# Patient Record
Sex: FEMALE | Race: WHITE | Employment: OTHER | URBAN - METROPOLITAN AREA
[De-identification: names, ages, dates, MRNs, and addresses within clinical notes are randomized per-mention and may not be internally consistent; named-entity substitution may affect disease eponyms.]

---

## 2017-08-04 PROBLEM — M48.061 SPINAL STENOSIS, LUMBAR REGION, WITHOUT NEUROGENIC CLAUDICATION: Status: ACTIVE | Noted: 2017-08-04

## 2017-08-09 ENCOUNTER — TELEPHONE (OUTPATIENT)
Dept: PODIATRY | Facility: CLINIC | Age: 72
End: 2017-08-09

## 2017-10-10 PROBLEM — M47.817 LUMBOSACRAL SPONDYLOSIS WITHOUT MYELOPATHY: Status: ACTIVE | Noted: 2017-10-10

## 2019-12-29 ENCOUNTER — HOSPITAL ENCOUNTER (OUTPATIENT)
Dept: GENERAL RADIOLOGY | Age: 74
Discharge: HOME | End: 2019-12-31
Payer: MEDICARE

## 2019-12-29 ENCOUNTER — HOSPITAL ENCOUNTER (OUTPATIENT)
Dept: MRI IMAGING | Age: 74
Discharge: HOME | End: 2019-12-31
Payer: MEDICARE

## 2019-12-29 LAB
GFR AFRICAN AMERICAN: >60
GFR NON-AFRICAN AMERICAN: >60
PERFORMED ON: NORMAL
POC CREATININE: 0.7 MG/DL (ref 0.6–1.2)
POC SAMPLE TYPE: NORMAL

## 2019-12-29 PROCEDURE — 72158 MRI LUMBAR SPINE W/O & W/DYE: CPT

## 2019-12-29 PROCEDURE — 6360000004 HC RX CONTRAST MEDICATION: Performed by: NEUROLOGICAL SURGERY

## 2019-12-29 PROCEDURE — 72110 X-RAY EXAM L-2 SPINE 4/>VWS: CPT

## 2019-12-29 PROCEDURE — A9579 GAD-BASE MR CONTRAST NOS,1ML: HCPCS | Performed by: NEUROLOGICAL SURGERY

## 2019-12-29 RX ADMIN — GADOTERIDOL 15 ML: 279.3 INJECTION, SOLUTION INTRAVENOUS at 08:18

## 2020-02-04 ENCOUNTER — HOSPITAL ENCOUNTER (OUTPATIENT)
Dept: WOMENS IMAGING | Age: 75
Discharge: HOME | End: 2020-02-06
Payer: MEDICARE

## 2020-02-04 PROCEDURE — 77080 DXA BONE DENSITY AXIAL: CPT

## 2020-06-04 ENCOUNTER — HOSPITAL ENCOUNTER (OUTPATIENT)
Dept: PREADMISSION TESTING | Age: 75
Discharge: HOME | DRG: 242 | End: 2020-06-08
Payer: MEDICARE

## 2020-06-04 ENCOUNTER — HOSPITAL ENCOUNTER (OUTPATIENT)
Dept: GENERAL RADIOLOGY | Age: 75
Discharge: HOME | DRG: 242 | End: 2020-06-06
Payer: MEDICARE

## 2020-06-04 VITALS
RESPIRATION RATE: 18 BRPM | HEART RATE: 83 BPM | DIASTOLIC BLOOD PRESSURE: 70 MMHG | OXYGEN SATURATION: 98 % | WEIGHT: 179.25 LBS | TEMPERATURE: 96.7 F | BODY MASS INDEX: 28.81 KG/M2 | HEIGHT: 66 IN | SYSTOLIC BLOOD PRESSURE: 134 MMHG

## 2020-06-04 PROBLEM — M47.816 LUMBAR SPONDYLOSIS: Status: ACTIVE | Noted: 2020-06-04

## 2020-06-04 PROBLEM — I48.0 PAF (PAROXYSMAL ATRIAL FIBRILLATION) (HCC): Chronic | Status: ACTIVE | Noted: 2020-06-04

## 2020-06-04 PROBLEM — M48.061 FORAMINAL STENOSIS OF LUMBAR REGION: Status: ACTIVE | Noted: 2020-06-04

## 2020-06-04 PROBLEM — K56.0 PARALYTIC ILEUS (HCC): Chronic | Status: ACTIVE | Noted: 2020-06-04

## 2020-06-04 PROBLEM — E11.9 DIABETES MELLITUS TYPE 2, UNCOMPLICATED (HCC): Status: ACTIVE | Noted: 2020-06-04

## 2020-06-04 LAB
ABO/RH: NORMAL
ANTIBODY SCREEN: NORMAL
EKG ATRIAL RATE: 55 BPM
EKG P AXIS: 65 DEGREES
EKG P-R INTERVAL: 164 MS
EKG Q-T INTERVAL: 430 MS
EKG QRS DURATION: 146 MS
EKG QTC CALCULATION (BAZETT): 411 MS
EKG R AXIS: -36 DEGREES
EKG T AXIS: 104 DEGREES
EKG VENTRICULAR RATE: 55 BPM
INR BLD: 1.1
PROTHROMBIN TIME: 13.9 SEC (ref 12.3–14.9)

## 2020-06-04 PROCEDURE — 86901 BLOOD TYPING SEROLOGIC RH(D): CPT

## 2020-06-04 PROCEDURE — 72082 X-RAY EXAM ENTIRE SPI 2/3 VW: CPT

## 2020-06-04 PROCEDURE — 85610 PROTHROMBIN TIME: CPT

## 2020-06-04 PROCEDURE — 86900 BLOOD TYPING SEROLOGIC ABO: CPT

## 2020-06-04 PROCEDURE — 93010 ELECTROCARDIOGRAM REPORT: CPT | Performed by: INTERNAL MEDICINE

## 2020-06-04 PROCEDURE — 93005 ELECTROCARDIOGRAM TRACING: CPT | Performed by: NURSE PRACTITIONER

## 2020-06-04 PROCEDURE — 86850 RBC ANTIBODY SCREEN: CPT

## 2020-06-04 RX ORDER — SODIUM CHLORIDE 0.9 % (FLUSH) 0.9 %
10 SYRINGE (ML) INJECTION EVERY 12 HOURS SCHEDULED
Status: CANCELLED | OUTPATIENT
Start: 2020-06-04

## 2020-06-04 RX ORDER — SODIUM CHLORIDE 0.9 % (FLUSH) 0.9 %
10 SYRINGE (ML) INJECTION PRN
Status: CANCELLED | OUTPATIENT
Start: 2020-06-04

## 2020-06-04 RX ORDER — LIDOCAINE HYDROCHLORIDE 10 MG/ML
1 INJECTION, SOLUTION EPIDURAL; INFILTRATION; INTRACAUDAL; PERINEURAL
Status: CANCELLED | OUTPATIENT
Start: 2020-06-04 | End: 2020-06-04

## 2020-06-04 RX ORDER — ACETAMINOPHEN 325 MG/1
650 TABLET ORAL EVERY 6 HOURS PRN
COMMUNITY

## 2020-06-04 RX ORDER — SODIUM CHLORIDE, SODIUM LACTATE, POTASSIUM CHLORIDE, CALCIUM CHLORIDE 600; 310; 30; 20 MG/100ML; MG/100ML; MG/100ML; MG/100ML
INJECTION, SOLUTION INTRAVENOUS ONCE
Status: CANCELLED | OUTPATIENT
Start: 2020-06-11

## 2020-06-04 RX ORDER — CEFAZOLIN SODIUM 2 G/50ML
2 SOLUTION INTRAVENOUS ONCE
Status: CANCELLED | OUTPATIENT
Start: 2020-06-11

## 2020-06-04 RX ORDER — SODIUM CHLORIDE, SODIUM LACTATE, POTASSIUM CHLORIDE, CALCIUM CHLORIDE 600; 310; 30; 20 MG/100ML; MG/100ML; MG/100ML; MG/100ML
INJECTION, SOLUTION INTRAVENOUS CONTINUOUS
Status: CANCELLED | OUTPATIENT
Start: 2020-06-04

## 2020-06-04 RX ORDER — MULTIVIT-MIN/IRON/FOLIC ACID/K 18-600-40
CAPSULE ORAL
Status: ON HOLD | COMMUNITY
End: 2020-06-09 | Stop reason: HOSPADM

## 2020-06-04 RX ORDER — DOCUSATE SODIUM 100 MG/1
100 CAPSULE, LIQUID FILLED ORAL 2 TIMES DAILY
COMMUNITY

## 2020-06-04 RX ORDER — TIZANIDINE 4 MG/1
4 TABLET ORAL EVERY 6 HOURS PRN
COMMUNITY
End: 2020-06-10 | Stop reason: SDUPTHER

## 2020-06-04 ASSESSMENT — ENCOUNTER SYMPTOMS
DIARRHEA: 0
STRIDOR: 0
TROUBLE SWALLOWING: 0
ALLERGIC/IMMUNOLOGIC NEGATIVE: 1
CHEST TIGHTNESS: 0
BACK PAIN: 1
ABDOMINAL PAIN: 0
CONSTIPATION: 1
WHEEZING: 0
SORE THROAT: 0
NAUSEA: 0
EYES NEGATIVE: 1
COUGH: 0
SHORTNESS OF BREATH: 0
VOMITING: 0

## 2020-06-04 NOTE — H&P
pain 60 tablet 0     No current facility-administered medications for this encounter. Allergies: Other and Dilaudid [hydromorphone hcl]    Social History:   Social History     Socioeconomic History    Marital status:      Spouse name: Not on file    Number of children: Not on file    Years of education: Not on file    Highest education level: Not on file   Occupational History    Not on file   Social Needs    Financial resource strain: Not on file    Food insecurity     Worry: Not on file     Inability: Not on file    Transportation needs     Medical: Not on file     Non-medical: Not on file   Tobacco Use    Smoking status: Never Smoker    Smokeless tobacco: Never Used   Substance and Sexual Activity    Alcohol use: No     Alcohol/week: 0.0 standard drinks    Drug use: No    Sexual activity: Not on file   Lifestyle    Physical activity     Days per week: Not on file     Minutes per session: Not on file    Stress: Not on file   Relationships    Social connections     Talks on phone: Not on file     Gets together: Not on file     Attends Amish service: Not on file     Active member of club or organization: Not on file     Attends meetings of clubs or organizations: Not on file     Relationship status: Not on file    Intimate partner violence     Fear of current or ex partner: Not on file     Emotionally abused: Not on file     Physically abused: Not on file     Forced sexual activity: Not on file   Other Topics Concern    Not on file   Social History Narrative    Not on file       Family History:       Problem Relation Age of Onset    Arthritis Mother     High Blood Pressure Father     Heart Disease Father        Review of Systems   Constitutional: Negative. Negative for chills and fever. HENT: Positive for congestion (sinus), hearing loss, postnasal drip and tinnitus. Negative for sore throat and trouble swallowing. Bilateral hearing aides   Eyes: Negative. Negative for visual disturbance. IOL OU   Respiratory: Negative for cough, chest tightness, shortness of breath, wheezing and stridor. Cardiovascular: Negative for chest pain and palpitations. Gastrointestinal: Positive for constipation (prone ). Negative for abdominal pain, diarrhea, nausea and vomiting. Endocrine:        Dx diabetes   Genitourinary: Negative for dysuria and frequency. Musculoskeletal: Positive for back pain (radiates to left leg). Negative for myalgias and neck pain. Skin: Negative. Allergic/Immunologic: Negative. Neurological: Negative. Negative for seizures and headaches. Hematological: Negative. Psychiatric/Behavioral: Negative. Vitals: There were no vitals taken for this visit. Physical Exam  Constitutional:       Appearance: She is well-developed. HENT:      Head: Normocephalic and atraumatic. Right Ear: Tympanic membrane, ear canal and external ear normal.      Left Ear: Tympanic membrane, ear canal and external ear normal.      Nose: No congestion. Mouth/Throat:      Mouth: Mucous membranes are moist.   Eyes:      General: No scleral icterus. Extraocular Movements: Extraocular movements intact. Conjunctiva/sclera: Conjunctivae normal.      Pupils: Pupils are equal, round, and reactive to light. Comments: IOL OU. Neck:      Musculoskeletal: Normal range of motion. Thyroid: No thyromegaly. Vascular: No JVD. Trachea: No tracheal deviation. Cardiovascular:      Rate and Rhythm: Normal rate and regular rhythm. Heart sounds: Normal heart sounds. No murmur. No gallop. Pulmonary:      Effort: Pulmonary effort is normal. No respiratory distress. Breath sounds: Normal breath sounds. No wheezing or rales. Abdominal:      General: Bowel sounds are normal.      Palpations: Abdomen is soft. Tenderness: There is no abdominal tenderness. Comments: Mid line OR scar.    Genitourinary:     Comments:

## 2020-06-05 ENCOUNTER — HOSPITAL ENCOUNTER (INPATIENT)
Age: 75
LOS: 4 days | Discharge: HOME | DRG: 242 | End: 2020-06-09
Attending: EMERGENCY MEDICINE | Admitting: INTERNAL MEDICINE
Payer: MEDICARE

## 2020-06-05 LAB
ABO/RH: NORMAL
ALBUMIN SERPL-MCNC: 4.3 G/DL (ref 3.5–4.6)
ALP BLD-CCNC: 64 U/L (ref 40–130)
ALT SERPL-CCNC: 27 U/L (ref 0–33)
ANION GAP SERPL CALCULATED.3IONS-SCNC: 14 MEQ/L (ref 9–15)
ANTIBODY SCREEN: NORMAL
APTT: 30.3 SEC (ref 24.4–36.8)
AST SERPL-CCNC: 26 U/L (ref 0–35)
BASOPHILS ABSOLUTE: 0.1 K/UL (ref 0–0.2)
BASOPHILS RELATIVE PERCENT: 0.7 %
BILIRUB SERPL-MCNC: <0.2 MG/DL (ref 0.2–0.7)
BUN BLDV-MCNC: 17 MG/DL (ref 8–23)
CALCIUM SERPL-MCNC: 9.8 MG/DL (ref 8.5–9.9)
CHLORIDE BLD-SCNC: 100 MEQ/L (ref 95–107)
CO2: 23 MEQ/L (ref 20–31)
CREAT SERPL-MCNC: 0.94 MG/DL (ref 0.5–0.9)
EOSINOPHILS ABSOLUTE: 0.1 K/UL (ref 0–0.7)
EOSINOPHILS RELATIVE PERCENT: 0.6 %
GFR AFRICAN AMERICAN: >60
GFR NON-AFRICAN AMERICAN: 57.8
GLOBULIN: 2.9 G/DL (ref 2.3–3.5)
GLUCOSE BLD-MCNC: 210 MG/DL (ref 70–99)
HCT VFR BLD CALC: 38.5 % (ref 37–47)
HEMOGLOBIN: 12.7 G/DL (ref 12–16)
INR BLD: 1.1
LYMPHOCYTES ABSOLUTE: 4.1 K/UL (ref 1–4.8)
LYMPHOCYTES RELATIVE PERCENT: 40 %
MAGNESIUM: 1.6 MG/DL (ref 1.7–2.4)
MCH RBC QN AUTO: 29.8 PG (ref 27–31.3)
MCHC RBC AUTO-ENTMCNC: 33.1 % (ref 33–37)
MCV RBC AUTO: 90.2 FL (ref 82–100)
MONOCYTES ABSOLUTE: 0.7 K/UL (ref 0.2–0.8)
MONOCYTES RELATIVE PERCENT: 6.5 %
NEUTROPHILS ABSOLUTE: 5.3 K/UL (ref 1.4–6.5)
NEUTROPHILS RELATIVE PERCENT: 52.2 %
PDW BLD-RTO: 13.1 % (ref 11.5–14.5)
PLATELET # BLD: 403 K/UL (ref 130–400)
POTASSIUM SERPL-SCNC: 4.5 MEQ/L (ref 3.4–4.9)
PROTHROMBIN TIME: 14 SEC (ref 12.3–14.9)
RBC # BLD: 4.27 M/UL (ref 4.2–5.4)
SODIUM BLD-SCNC: 137 MEQ/L (ref 135–144)
TOTAL PROTEIN: 7.2 G/DL (ref 6.3–8)
TROPONIN: <0.01 NG/ML (ref 0–0.01)
TROPONIN: <0.01 NG/ML (ref 0–0.01)
TSH SERPL DL<=0.05 MIU/L-ACNC: 1.33 UIU/ML (ref 0.44–3.86)
WBC # BLD: 10.1 K/UL (ref 4.8–10.8)

## 2020-06-05 PROCEDURE — 93005 ELECTROCARDIOGRAM TRACING: CPT | Performed by: EMERGENCY MEDICINE

## 2020-06-05 PROCEDURE — 2000000000 HC ICU R&B

## 2020-06-05 PROCEDURE — 85730 THROMBOPLASTIN TIME PARTIAL: CPT

## 2020-06-05 PROCEDURE — 86850 RBC ANTIBODY SCREEN: CPT

## 2020-06-05 PROCEDURE — 80053 COMPREHEN METABOLIC PANEL: CPT

## 2020-06-05 PROCEDURE — 2500000003 HC RX 250 WO HCPCS

## 2020-06-05 PROCEDURE — C1887 CATHETER, GUIDING: HCPCS

## 2020-06-05 PROCEDURE — 99285 EMERGENCY DEPT VISIT HI MDM: CPT

## 2020-06-05 PROCEDURE — 86901 BLOOD TYPING SEROLOGIC RH(D): CPT

## 2020-06-05 PROCEDURE — 83735 ASSAY OF MAGNESIUM: CPT

## 2020-06-05 PROCEDURE — 33210 INSERT ELECTRD/PM CATH SNGL: CPT | Performed by: INTERNAL MEDICINE

## 2020-06-05 PROCEDURE — 2580000003 HC RX 258: Performed by: INTERNAL MEDICINE

## 2020-06-05 PROCEDURE — 96374 THER/PROPH/DIAG INJ IV PUSH: CPT

## 2020-06-05 PROCEDURE — 2580000003 HC RX 258

## 2020-06-05 PROCEDURE — 6370000000 HC RX 637 (ALT 250 FOR IP): Performed by: INTERNAL MEDICINE

## 2020-06-05 PROCEDURE — C1894 INTRO/SHEATH, NON-LASER: HCPCS

## 2020-06-05 PROCEDURE — 85610 PROTHROMBIN TIME: CPT

## 2020-06-05 PROCEDURE — 5A1223Z PERFORMANCE OF CARDIAC PACING, CONTINUOUS: ICD-10-PCS | Performed by: INTERNAL MEDICINE

## 2020-06-05 PROCEDURE — 85025 COMPLETE CBC W/AUTO DIFF WBC: CPT

## 2020-06-05 PROCEDURE — 6360000002 HC RX W HCPCS: Performed by: EMERGENCY MEDICINE

## 2020-06-05 PROCEDURE — 36415 COLL VENOUS BLD VENIPUNCTURE: CPT

## 2020-06-05 PROCEDURE — 84443 ASSAY THYROID STIM HORMONE: CPT

## 2020-06-05 PROCEDURE — 86900 BLOOD TYPING SEROLOGIC ABO: CPT

## 2020-06-05 PROCEDURE — 99223 1ST HOSP IP/OBS HIGH 75: CPT | Performed by: INTERNAL MEDICINE

## 2020-06-05 PROCEDURE — 2580000003 HC RX 258: Performed by: EMERGENCY MEDICINE

## 2020-06-05 PROCEDURE — 6360000002 HC RX W HCPCS

## 2020-06-05 PROCEDURE — 92953 TEMPORARY EXTERNAL PACING: CPT

## 2020-06-05 PROCEDURE — 84484 ASSAY OF TROPONIN QUANT: CPT

## 2020-06-05 PROCEDURE — 6830039000 HC L3 TRAUMA ALERT

## 2020-06-05 RX ORDER — LANOLIN ALCOHOL/MO/W.PET/CERES
250 CREAM (GRAM) TOPICAL DAILY
Status: DISCONTINUED | OUTPATIENT
Start: 2020-06-05 | End: 2020-06-09 | Stop reason: HOSPADM

## 2020-06-05 RX ORDER — LOSARTAN POTASSIUM 25 MG/1
25 TABLET ORAL NIGHTLY
Status: DISCONTINUED | OUTPATIENT
Start: 2020-06-05 | End: 2020-06-09 | Stop reason: HOSPADM

## 2020-06-05 RX ORDER — 0.9 % SODIUM CHLORIDE 0.9 %
1000 INTRAVENOUS SOLUTION INTRAVENOUS ONCE
Status: COMPLETED | OUTPATIENT
Start: 2020-06-05 | End: 2020-06-05

## 2020-06-05 RX ORDER — ROSUVASTATIN CALCIUM 5 MG/1
20 TABLET, COATED ORAL NIGHTLY
Status: DISCONTINUED | OUTPATIENT
Start: 2020-06-05 | End: 2020-06-09 | Stop reason: HOSPADM

## 2020-06-05 RX ORDER — HYDRALAZINE HYDROCHLORIDE 20 MG/ML
10 INJECTION INTRAMUSCULAR; INTRAVENOUS EVERY 10 MIN PRN
Status: DISCONTINUED | OUTPATIENT
Start: 2020-06-05 | End: 2020-06-09 | Stop reason: HOSPADM

## 2020-06-05 RX ORDER — GLIPIZIDE 5 MG/1
2.5 TABLET ORAL
Status: DISCONTINUED | OUTPATIENT
Start: 2020-06-06 | End: 2020-06-09 | Stop reason: HOSPADM

## 2020-06-05 RX ORDER — ACETAMINOPHEN 325 MG/1
650 TABLET ORAL EVERY 4 HOURS PRN
Status: DISCONTINUED | OUTPATIENT
Start: 2020-06-05 | End: 2020-06-08 | Stop reason: SDUPTHER

## 2020-06-05 RX ORDER — LABETALOL 20 MG/4 ML (5 MG/ML) INTRAVENOUS SYRINGE
10 EVERY 30 MIN PRN
Status: DISCONTINUED | OUTPATIENT
Start: 2020-06-05 | End: 2020-06-09 | Stop reason: HOSPADM

## 2020-06-05 RX ORDER — DOPAMINE HYDROCHLORIDE 160 MG/100ML
10 INJECTION, SOLUTION INTRAVENOUS CONTINUOUS
Status: DISCONTINUED | OUTPATIENT
Start: 2020-06-05 | End: 2020-06-05

## 2020-06-05 RX ORDER — ATROPINE SULFATE 0.1 MG/ML
0.5 INJECTION INTRAVENOUS ONCE
Status: COMPLETED | OUTPATIENT
Start: 2020-06-05 | End: 2020-06-05

## 2020-06-05 RX ORDER — ONDANSETRON 2 MG/ML
4 INJECTION INTRAMUSCULAR; INTRAVENOUS EVERY 6 HOURS PRN
Status: DISCONTINUED | OUTPATIENT
Start: 2020-06-05 | End: 2020-06-09 | Stop reason: HOSPADM

## 2020-06-05 RX ORDER — ACETAMINOPHEN 80 MG
TABLET,CHEWABLE ORAL ONCE
Status: COMPLETED | OUTPATIENT
Start: 2020-06-05 | End: 2020-06-05

## 2020-06-05 RX ORDER — GABAPENTIN 100 MG/1
100 CAPSULE ORAL NIGHTLY
Status: DISCONTINUED | OUTPATIENT
Start: 2020-06-05 | End: 2020-06-09 | Stop reason: HOSPADM

## 2020-06-05 RX ORDER — TIZANIDINE 4 MG/1
4 TABLET ORAL EVERY 6 HOURS PRN
Status: DISCONTINUED | OUTPATIENT
Start: 2020-06-05 | End: 2020-06-09 | Stop reason: HOSPADM

## 2020-06-05 RX ORDER — SODIUM CHLORIDE 9 MG/ML
INJECTION, SOLUTION INTRAVENOUS CONTINUOUS
Status: DISCONTINUED | OUTPATIENT
Start: 2020-06-05 | End: 2020-06-08

## 2020-06-05 RX ADMIN — GABAPENTIN 100 MG: 100 CAPSULE ORAL at 20:20

## 2020-06-05 RX ADMIN — LOSARTAN POTASSIUM 25 MG: 25 TABLET, FILM COATED ORAL at 20:20

## 2020-06-05 RX ADMIN — TIZANIDINE 4 MG: 4 TABLET ORAL at 18:27

## 2020-06-05 RX ADMIN — ATROPINE SULFATE 0.5 MG: 0.1 INJECTION PARENTERAL at 15:55

## 2020-06-05 RX ADMIN — SODIUM CHLORIDE 1000 ML: 9 INJECTION, SOLUTION INTRAVENOUS at 16:08

## 2020-06-05 RX ADMIN — ACETAMINOPHEN 650 MG: 325 TABLET, FILM COATED ORAL at 20:20

## 2020-06-05 RX ADMIN — DOPAMINE HYDROCHLORIDE 10 MCG/KG/MIN: 160 INJECTION, SOLUTION INTRAVENOUS at 16:04

## 2020-06-05 RX ADMIN — ROSUVASTATIN CALCIUM 20 MG: 5 TABLET, FILM COATED ORAL at 20:19

## 2020-06-05 RX ADMIN — Medication: at 22:31

## 2020-06-05 RX ADMIN — SODIUM CHLORIDE: 9 INJECTION, SOLUTION INTRAVENOUS at 18:24

## 2020-06-05 ASSESSMENT — PAIN DESCRIPTION - PAIN TYPE
TYPE: CHRONIC PAIN;ACUTE PAIN
TYPE: CHRONIC PAIN

## 2020-06-05 ASSESSMENT — ENCOUNTER SYMPTOMS
NAUSEA: 0
ABDOMINAL PAIN: 0
ALLERGIC/IMMUNOLOGIC NEGATIVE: 1
SORE THROAT: 0
GASTROINTESTINAL NEGATIVE: 1
SHORTNESS OF BREATH: 0
BACK PAIN: 0
EYES NEGATIVE: 1
COUGH: 0
VOMITING: 0
DIARRHEA: 0
WHEEZING: 0

## 2020-06-05 ASSESSMENT — PAIN DESCRIPTION - LOCATION
LOCATION: BACK

## 2020-06-05 ASSESSMENT — PAIN SCALES - GENERAL
PAINLEVEL_OUTOF10: 0
PAINLEVEL_OUTOF10: 0
PAINLEVEL_OUTOF10: 4
PAINLEVEL_OUTOF10: 5
PAINLEVEL_OUTOF10: 7
PAINLEVEL_OUTOF10: 8
PAINLEVEL_OUTOF10: 0
PAINLEVEL_OUTOF10: 7

## 2020-06-05 ASSESSMENT — PAIN DESCRIPTION - DESCRIPTORS: DESCRIPTORS: ACHING

## 2020-06-05 ASSESSMENT — PAIN DESCRIPTION - FREQUENCY: FREQUENCY: CONTINUOUS

## 2020-06-05 NOTE — PROGRESS NOTES
Medicated with Zanaflex for pack spasms.  Electronically signed by Clay Byrd RN on 6/5/2020 at 6:38 PM

## 2020-06-05 NOTE — H&P
([facility] [address] [facility]    Paralytic ileus ([facility] [address]       Past Surgical History:   Procedure Laterality Date    BACK SURGERY      x 3 ORs lumbar spine--last     CARPAL TUNNEL RELEASE Bilateral 2000s     SECTION   &     EYE SURGERY      [place] with IOL OU    HYSTERECTOMY  1987    SPINE SURGERY      TONSILLECTOMY      as child       Social History     Socioeconomic History    Marital status:      Spouse name: None    Number of children: None    Years of education: None    Highest education level: None   Occupational History    None   Social Needs    Financial resource strain: None    Food insecurity     Worry: None     Inability: None    Transportation needs     Medical: None     Non-medical: None   Tobacco Use    Smoking status: Never Smoker    Smokeless tobacco: Never Used   Substance and Sexual Activity    Alcohol use:  Yes     Alcohol/week: 0.0 standard drinks     Comment: social rare    Drug use: No    Sexual activity: None   Lifestyle    Physical activity     Days per week: None     Minutes per session: None    Stress: None   Relationships    Social connections     Talks on phone: None     Gets together: None     Attends Holiness service: None     Active member of club or organization: None     Attends meetings of clubs or organizations: None     Relationship status: None    Intimate partner violence     Fear of current or ex partner: None     Emotionally abused: None     Physically abused: None     Forced sexual activity: None   Other Topics Concern    None   Social History Narrative    None       Family History   Problem Relation Age of Onset    Arthritis Mother     High Blood Pressure Father     Heart Disease Father     Heart Failure Father     No Known Problems Sister     Heart Disease Son         cardiac stents    Diabetes Son     Diabetes Daughter         neuropathy    Thyroid Disease Daughter     Diabetes Sister     Kidney Disease Sister        Current Constitutional: Negative. Negative for chills and fever. HENT: Negative. Eyes: Negative. Respiratory: Negative for shortness of breath and wheezing. Cardiovascular: Negative for chest pain, palpitations and leg swelling. Gastrointestinal: Negative. Negative for abdominal pain, nausea and vomiting. Endocrine: Negative. Genitourinary: Negative. Musculoskeletal: Negative. Skin: Negative. Negative for rash. Allergic/Immunologic: Negative. Neurological: Positive for syncope. Negative for dizziness, weakness and headaches. Hematological: Negative. Psychiatric/Behavioral: Negative. VITALS:  Blood pressure (!) 177/71, pulse 77, temperature 98.1 °F (36.7 °C), temperature source Oral, resp. rate 18, height 5' 6\" (1.676 m), weight 180 lb (81.6 kg), SpO2 98 %. Body mass index is 29.05 kg/m². Physical Exam   Constitutional: She is oriented to person, place, and time. She appears well-developed and well-nourished. HENT:   Head: Normocephalic and atraumatic. Eyes: Pupils are equal, round, and reactive to light. Neck: Normal range of motion. Neck supple. No JVD present. No tracheal deviation present. No thyromegaly present. Cardiovascular: Normal rate, regular rhythm, normal heart sounds and intact distal pulses. PMI is not displaced. Exam reveals no gallop, no S3, no distant heart sounds and no friction rub. No murmur heard. Pulmonary/Chest: No respiratory distress. She has no wheezes. She has no rales. She exhibits no tenderness. Abdominal: Soft. Bowel sounds are normal. She exhibits no distension and no mass. There is no abdominal tenderness. There is no rebound and no guarding. Musculoskeletal:         General: No edema. Neurological: She is alert and oriented to person, place, and time. No cranial nerve deficit. Skin: Skin is warm and dry. No rash noted. She is not diaphoretic. No erythema. No pallor. Psychiatric: She has a normal mood and affect.  Her

## 2020-06-05 NOTE — ED NOTES
Bed: 09  Expected date: 6/5/20  Expected time: 3:41 PM  Means of arrival: [facility]  Comments:  67 F - syncope 3rd degree block 25-30 bpm. 109/83,100% RA.  . 20G SUSAN Sahu RN  06/05/20 7020

## 2020-06-06 ENCOUNTER — APPOINTMENT (OUTPATIENT)
Dept: GENERAL RADIOLOGY | Age: 75
DRG: 242 | End: 2020-06-06
Payer: MEDICARE

## 2020-06-06 LAB
ANION GAP SERPL CALCULATED.3IONS-SCNC: 8 MEQ/L (ref 9–15)
BUN BLDV-MCNC: 17 MG/DL (ref 8–23)
CALCIUM SERPL-MCNC: 9.2 MG/DL (ref 8.5–9.9)
CHLORIDE BLD-SCNC: 107 MEQ/L (ref 95–107)
CO2: 25 MEQ/L (ref 20–31)
CREAT SERPL-MCNC: 0.8 MG/DL (ref 0.5–0.9)
GFR AFRICAN AMERICAN: >60
GFR NON-AFRICAN AMERICAN: >60
GLUCOSE BLD-MCNC: 100 MG/DL (ref 60–115)
GLUCOSE BLD-MCNC: 123 MG/DL (ref 70–99)
GLUCOSE BLD-MCNC: 175 MG/DL (ref 60–115)
HCT VFR BLD CALC: 34.2 % (ref 37–47)
HEMOGLOBIN: 11.3 G/DL (ref 12–16)
LV EF: 60 %
LVEF MODALITY: NORMAL
MCH RBC QN AUTO: 30 PG (ref 27–31.3)
MCHC RBC AUTO-ENTMCNC: 33.1 % (ref 33–37)
MCV RBC AUTO: 90.6 FL (ref 82–100)
PDW BLD-RTO: 12.9 % (ref 11.5–14.5)
PERFORMED ON: ABNORMAL
PERFORMED ON: NORMAL
PLATELET # BLD: 343 K/UL (ref 130–400)
POTASSIUM SERPL-SCNC: 4.5 MEQ/L (ref 3.4–4.9)
RBC # BLD: 3.78 M/UL (ref 4.2–5.4)
SODIUM BLD-SCNC: 140 MEQ/L (ref 135–144)
TROPONIN: <0.01 NG/ML (ref 0–0.01)
WBC # BLD: 9.3 K/UL (ref 4.8–10.8)

## 2020-06-06 PROCEDURE — 6360000002 HC RX W HCPCS: Performed by: INTERNAL MEDICINE

## 2020-06-06 PROCEDURE — 36415 COLL VENOUS BLD VENIPUNCTURE: CPT

## 2020-06-06 PROCEDURE — 93306 TTE W/DOPPLER COMPLETE: CPT

## 2020-06-06 PROCEDURE — 6370000000 HC RX 637 (ALT 250 FOR IP): Performed by: INTERNAL MEDICINE

## 2020-06-06 PROCEDURE — 85027 COMPLETE CBC AUTOMATED: CPT

## 2020-06-06 PROCEDURE — 80048 BASIC METABOLIC PNL TOTAL CA: CPT

## 2020-06-06 PROCEDURE — 2000000000 HC ICU R&B

## 2020-06-06 PROCEDURE — 84484 ASSAY OF TROPONIN QUANT: CPT

## 2020-06-06 PROCEDURE — 99233 SBSQ HOSP IP/OBS HIGH 50: CPT | Performed by: INTERNAL MEDICINE

## 2020-06-06 PROCEDURE — 71045 X-RAY EXAM CHEST 1 VIEW: CPT

## 2020-06-06 PROCEDURE — 2580000003 HC RX 258: Performed by: INTERNAL MEDICINE

## 2020-06-06 PROCEDURE — 93005 ELECTROCARDIOGRAM TRACING: CPT | Performed by: INTERNAL MEDICINE

## 2020-06-06 RX ORDER — DILTIAZEM HYDROCHLORIDE 120 MG/1
120 CAPSULE, COATED, EXTENDED RELEASE ORAL DAILY
Status: DISCONTINUED | OUTPATIENT
Start: 2020-06-06 | End: 2020-06-09 | Stop reason: HOSPADM

## 2020-06-06 RX ORDER — LIDOCAINE 4 G/G
1 PATCH TOPICAL DAILY
Status: DISCONTINUED | OUTPATIENT
Start: 2020-06-06 | End: 2020-06-09 | Stop reason: HOSPADM

## 2020-06-06 RX ORDER — LATANOPROST 50 UG/ML
1 SOLUTION/ DROPS OPHTHALMIC NIGHTLY
Status: DISCONTINUED | OUTPATIENT
Start: 2020-06-06 | End: 2020-06-09 | Stop reason: HOSPADM

## 2020-06-06 RX ORDER — DOCUSATE SODIUM 100 MG/1
100 CAPSULE, LIQUID FILLED ORAL DAILY PRN
Status: DISCONTINUED | OUTPATIENT
Start: 2020-06-06 | End: 2020-06-09 | Stop reason: HOSPADM

## 2020-06-06 RX ADMIN — SODIUM CHLORIDE: 9 INJECTION, SOLUTION INTRAVENOUS at 07:35

## 2020-06-06 RX ADMIN — LOSARTAN POTASSIUM 25 MG: 25 TABLET, FILM COATED ORAL at 20:50

## 2020-06-06 RX ADMIN — METFORMIN HYDROCHLORIDE 1000 MG: 500 TABLET ORAL at 08:13

## 2020-06-06 RX ADMIN — LATANOPROST 1 DROP: 50 SOLUTION OPHTHALMIC at 23:10

## 2020-06-06 RX ADMIN — GABAPENTIN 100 MG: 100 CAPSULE ORAL at 20:50

## 2020-06-06 RX ADMIN — TIZANIDINE 4 MG: 4 TABLET ORAL at 00:08

## 2020-06-06 RX ADMIN — ENOXAPARIN SODIUM 40 MG: 40 INJECTION SUBCUTANEOUS at 08:13

## 2020-06-06 RX ADMIN — DILTIAZEM HYDROCHLORIDE 120 MG: 120 CAPSULE, COATED, EXTENDED RELEASE ORAL at 20:50

## 2020-06-06 RX ADMIN — ACETAMINOPHEN 650 MG: 325 TABLET, FILM COATED ORAL at 12:34

## 2020-06-06 RX ADMIN — ROSUVASTATIN CALCIUM 20 MG: 5 TABLET, FILM COATED ORAL at 20:50

## 2020-06-06 RX ADMIN — TIZANIDINE 4 MG: 4 TABLET ORAL at 20:56

## 2020-06-06 RX ADMIN — MAGNESIUM OXIDE TAB 400 MG (241.3 MG ELEMENTAL MG) 300 MG: 400 (241.3 MG) TAB at 08:14

## 2020-06-06 RX ADMIN — GLIPIZIDE 2.5 MG: 5 TABLET ORAL at 08:17

## 2020-06-06 RX ADMIN — ACETAMINOPHEN 650 MG: 325 TABLET, FILM COATED ORAL at 02:53

## 2020-06-06 RX ADMIN — DOCUSATE SODIUM 100 MG: 100 CAPSULE, LIQUID FILLED ORAL at 20:59

## 2020-06-06 ASSESSMENT — PAIN DESCRIPTION - LOCATION: LOCATION: BACK

## 2020-06-06 ASSESSMENT — PAIN SCALES - GENERAL
PAINLEVEL_OUTOF10: 0
PAINLEVEL_OUTOF10: 2
PAINLEVEL_OUTOF10: 6
PAINLEVEL_OUTOF10: 4
PAINLEVEL_OUTOF10: 10
PAINLEVEL_OUTOF10: 7
PAINLEVEL_OUTOF10: 7
PAINLEVEL_OUTOF10: 4
PAINLEVEL_OUTOF10: 6
PAINLEVEL_OUTOF10: 0
PAINLEVEL_OUTOF10: 6
PAINLEVEL_OUTOF10: 0
PAINLEVEL_OUTOF10: 6

## 2020-06-06 ASSESSMENT — PAIN DESCRIPTION - PAIN TYPE: TYPE: CHRONIC PAIN

## 2020-06-06 NOTE — PROGRESS NOTES
06/06/2020    K 4.5 06/06/2020     06/06/2020    CO2 25 06/06/2020    BUN 17 06/06/2020    LABALBU 4.3 06/05/2020    CREATININE 0.80 06/06/2020    CALCIUM 9.2 06/06/2020    GFRAA >60.0 06/06/2020    LABGLOM >60.0 06/06/2020    GLUCOSE 123 06/06/2020     Magnesium:    Lab Results   Component Value Date    MG 1.6 06/05/2020     Troponin:    Lab Results   Component Value Date    TROPONINI <0.010 06/06/2020       Radiology:  No results found. EKG:Paced beats      Assessment:    Active Hospital Problems    Diagnosis Date Noted    SSS (sick sinus syndrome) ([facility] [address] [I49.5]      Priority: High           Plan:  1. Permanent pacemaker Monday.   Electronically signed by Kate Still MD on 6/6/2020 at 5:20 PM

## 2020-06-06 NOTE — PROGRESS NOTES
Spiritual Care Services     Summary of Visit:  Pt was in the process of fulfilling her pre-surgery preps and she past out. She is anxious and worried about having the chances to have hack-surgery scheduled for 06/11. Her daughter had a miscarriage this week. I comforted her and her daughter. We prayed together, I anointed her and she received Holy Communion    Spiritual Assessment/Intervention/Outcomes:    Encounter Summary  Services provided to[de-identified] Patient and family together  Referral/Consult From[de-identified] Rounding  Support System: Children  Place of Rastafarian: [address]: No  Continue Visiting: Yes  Complexity of Encounter: Moderate  Length of Encounter: 30 minutes  Spiritual Assessment Completed: Yes  Routine  Type: Initial     Spiritual/Yazidi  Type: Spiritual support  Assessment: Approachable, Anxious, Peaceful, Helplessness  Intervention: Active listening, Explored feelings, thoughts, concerns, Explored coping resources, Nurtured hope, Prayer, Communion, Anointing  Outcome: Comfort, Expressed gratitude, Expressed feelings of laurie, peace, and/or awe, Receptive, Hopeful, Encouraged  Sacraments  Sacrament of Sick-Anointing: Anointed  Communion: Patient received communion, Family received communion     Advance Directives (For Healthcare)  Pre-existing DNR Comfort Care/DNR Arrest/DNI Order: No  Healthcare Directive: Yes, patient has an advance directive for healthcare treatment  Copy in Chart: No, copy requested from family           Values / Beliefs  Do you have any ethnic, cultural, sacramental, or spiritual Sabianist needs you would like us to be aware of while you are in the hospital?: No    Care Plan:    PT still has a critical surgery to undergo and needs the emotional and spiritual support the .      Spiritual Care Services   Electronically signed by Angel Henderson on 6/6/20 at 11:54 AM EDT     To reach a  for emotional and spiritual support, place an [facility] consult request. If a  is needed immediately, dial 0 and ask to page the on-call .   sp

## 2020-06-07 LAB
GLUCOSE BLD-MCNC: 111 MG/DL (ref 60–115)
GLUCOSE BLD-MCNC: 126 MG/DL (ref 60–115)
GLUCOSE BLD-MCNC: 141 MG/DL (ref 60–115)
GLUCOSE BLD-MCNC: 146 MG/DL (ref 60–115)
PERFORMED ON: ABNORMAL
PERFORMED ON: NORMAL

## 2020-06-07 PROCEDURE — 6370000000 HC RX 637 (ALT 250 FOR IP): Performed by: INTERNAL MEDICINE

## 2020-06-07 PROCEDURE — 6360000002 HC RX W HCPCS: Performed by: INTERNAL MEDICINE

## 2020-06-07 PROCEDURE — 2000000000 HC ICU R&B

## 2020-06-07 PROCEDURE — 99024 POSTOP FOLLOW-UP VISIT: CPT | Performed by: INTERNAL MEDICINE

## 2020-06-07 PROCEDURE — 2580000003 HC RX 258: Performed by: INTERNAL MEDICINE

## 2020-06-07 RX ORDER — TRAMADOL HYDROCHLORIDE 50 MG/1
50 TABLET ORAL 3 TIMES DAILY PRN
Status: DISCONTINUED | OUTPATIENT
Start: 2020-06-07 | End: 2020-06-09 | Stop reason: HOSPADM

## 2020-06-07 RX ADMIN — ACETAMINOPHEN 650 MG: 325 TABLET, FILM COATED ORAL at 04:18

## 2020-06-07 RX ADMIN — LOSARTAN POTASSIUM 25 MG: 25 TABLET, FILM COATED ORAL at 21:05

## 2020-06-07 RX ADMIN — TIZANIDINE 4 MG: 4 TABLET ORAL at 09:54

## 2020-06-07 RX ADMIN — ACETAMINOPHEN 650 MG: 325 TABLET, FILM COATED ORAL at 00:16

## 2020-06-07 RX ADMIN — ROSUVASTATIN CALCIUM 20 MG: 5 TABLET, FILM COATED ORAL at 21:04

## 2020-06-07 RX ADMIN — SODIUM CHLORIDE: 9 INJECTION, SOLUTION INTRAVENOUS at 00:17

## 2020-06-07 RX ADMIN — HYDRALAZINE HYDROCHLORIDE 10 MG: 20 INJECTION INTRAMUSCULAR; INTRAVENOUS at 21:04

## 2020-06-07 RX ADMIN — METFORMIN HYDROCHLORIDE 1000 MG: 500 TABLET ORAL at 08:23

## 2020-06-07 RX ADMIN — LATANOPROST 1 DROP: 50 SOLUTION OPHTHALMIC at 21:10

## 2020-06-07 RX ADMIN — ACETAMINOPHEN 650 MG: 325 TABLET, FILM COATED ORAL at 23:57

## 2020-06-07 RX ADMIN — DOCUSATE SODIUM 100 MG: 100 CAPSULE, LIQUID FILLED ORAL at 21:04

## 2020-06-07 RX ADMIN — TRAMADOL HYDROCHLORIDE 50 MG: 50 TABLET, FILM COATED ORAL at 21:10

## 2020-06-07 RX ADMIN — TRAMADOL HYDROCHLORIDE 50 MG: 50 TABLET, FILM COATED ORAL at 08:22

## 2020-06-07 RX ADMIN — ACETAMINOPHEN 650 MG: 325 TABLET, FILM COATED ORAL at 16:49

## 2020-06-07 RX ADMIN — GABAPENTIN 100 MG: 100 CAPSULE ORAL at 21:05

## 2020-06-07 RX ADMIN — GLIPIZIDE 2.5 MG: 5 TABLET ORAL at 08:23

## 2020-06-07 RX ADMIN — ENOXAPARIN SODIUM 40 MG: 40 INJECTION SUBCUTANEOUS at 08:23

## 2020-06-07 RX ADMIN — MAGNESIUM OXIDE TAB 400 MG (241.3 MG ELEMENTAL MG) 300 MG: 400 (241.3 MG) TAB at 08:23

## 2020-06-07 RX ADMIN — TIZANIDINE 4 MG: 4 TABLET ORAL at 03:47

## 2020-06-07 RX ADMIN — DILTIAZEM HYDROCHLORIDE 120 MG: 120 CAPSULE, COATED, EXTENDED RELEASE ORAL at 21:04

## 2020-06-07 RX ADMIN — TIZANIDINE 4 MG: 4 TABLET ORAL at 21:10

## 2020-06-07 ASSESSMENT — PAIN SCALES - GENERAL
PAINLEVEL_OUTOF10: 2
PAINLEVEL_OUTOF10: 9
PAINLEVEL_OUTOF10: 9
PAINLEVEL_OUTOF10: 4
PAINLEVEL_OUTOF10: 6
PAINLEVEL_OUTOF10: 2
PAINLEVEL_OUTOF10: 2
PAINLEVEL_OUTOF10: 0
PAINLEVEL_OUTOF10: 7
PAINLEVEL_OUTOF10: 2
PAINLEVEL_OUTOF10: 8
PAINLEVEL_OUTOF10: 9
PAINLEVEL_OUTOF10: 4
PAINLEVEL_OUTOF10: 9
PAINLEVEL_OUTOF10: 2
PAINLEVEL_OUTOF10: 10
PAINLEVEL_OUTOF10: 8
PAINLEVEL_OUTOF10: 3
PAINLEVEL_OUTOF10: 4

## 2020-06-07 ASSESSMENT — PAIN DESCRIPTION - FREQUENCY
FREQUENCY: CONTINUOUS
FREQUENCY: CONTINUOUS

## 2020-06-07 ASSESSMENT — PAIN DESCRIPTION - PAIN TYPE
TYPE: CHRONIC PAIN
TYPE: CHRONIC PAIN

## 2020-06-07 ASSESSMENT — PAIN DESCRIPTION - DESCRIPTORS
DESCRIPTORS: ACHING
DESCRIPTORS: ACHING

## 2020-06-07 ASSESSMENT — PAIN DESCRIPTION - LOCATION
LOCATION: BACK

## 2020-06-07 NOTE — PROGRESS NOTES
7P -7A shift summary:    Transvenous pacer remains in place and is operating appropriately. Patient's right leg remained straight throughout the night. Right pedal pulse strong. Patient complained of back discomfort due to her recent fall and the need of her cancelled back surgery. Patient medicated for pain PRN per prescribed orders. This RN discussed at length about the patient's upcoming permanent pacemaker surgery. Patient verbalized understanding.

## 2020-06-08 ENCOUNTER — APPOINTMENT (OUTPATIENT)
Dept: GENERAL RADIOLOGY | Age: 75
DRG: 242 | End: 2020-06-08
Payer: MEDICARE

## 2020-06-08 ENCOUNTER — APPOINTMENT (OUTPATIENT)
Dept: CARDIAC CATH/INVASIVE PROCEDURES | Age: 75
DRG: 242 | End: 2020-06-08
Payer: MEDICARE

## 2020-06-08 LAB
CHOLESTEROL, TOTAL: 125 MG/DL (ref 0–199)
GLUCOSE BLD-MCNC: 112 MG/DL (ref 60–115)
GLUCOSE BLD-MCNC: 145 MG/DL (ref 60–115)
GLUCOSE BLD-MCNC: 167 MG/DL (ref 60–115)
HCT VFR BLD CALC: 39.8 % (ref 37–47)
HDLC SERPL-MCNC: 46 MG/DL (ref 40–59)
HEMOGLOBIN: 13.2 G/DL (ref 12–16)
INR BLD: 1
LDL CHOLESTEROL CALCULATED: 53 MG/DL (ref 0–129)
MCH RBC QN AUTO: 30.4 PG (ref 27–31.3)
MCHC RBC AUTO-ENTMCNC: 33.3 % (ref 33–37)
MCV RBC AUTO: 91.3 FL (ref 82–100)
PDW BLD-RTO: 13 % (ref 11.5–14.5)
PERFORMED ON: ABNORMAL
PERFORMED ON: ABNORMAL
PERFORMED ON: NORMAL
PLATELET # BLD: 359 K/UL (ref 130–400)
PROTHROMBIN TIME: 13.3 SEC (ref 12.3–14.9)
RBC # BLD: 4.36 M/UL (ref 4.2–5.4)
TRIGL SERPL-MCNC: 128 MG/DL (ref 0–150)
WBC # BLD: 10 K/UL (ref 4.8–10.8)

## 2020-06-08 PROCEDURE — 85610 PROTHROMBIN TIME: CPT

## 2020-06-08 PROCEDURE — 80061 LIPID PANEL: CPT

## 2020-06-08 PROCEDURE — 6370000000 HC RX 637 (ALT 250 FOR IP): Performed by: INTERNAL MEDICINE

## 2020-06-08 PROCEDURE — C1785 PMKR, DUAL, RATE-RESP: HCPCS

## 2020-06-08 PROCEDURE — C1898 LEAD, PMKR, OTHER THAN TRANS: HCPCS

## 2020-06-08 PROCEDURE — 33208 INSRT HEART PM ATRIAL & VENT: CPT | Performed by: INTERNAL MEDICINE

## 2020-06-08 PROCEDURE — 2580000003 HC RX 258

## 2020-06-08 PROCEDURE — 0JH606Z INSERTION OF PACEMAKER, DUAL CHAMBER INTO CHEST SUBCUTANEOUS TISSUE AND FASCIA, OPEN APPROACH: ICD-10-PCS | Performed by: INTERNAL MEDICINE

## 2020-06-08 PROCEDURE — 2709999900 HC NON-CHARGEABLE SUPPLY

## 2020-06-08 PROCEDURE — 02HK3JZ INSERTION OF PACEMAKER LEAD INTO RIGHT VENTRICLE, PERCUTANEOUS APPROACH: ICD-10-PCS | Performed by: INTERNAL MEDICINE

## 2020-06-08 PROCEDURE — 6360000002 HC RX W HCPCS

## 2020-06-08 PROCEDURE — 36415 COLL VENOUS BLD VENIPUNCTURE: CPT

## 2020-06-08 PROCEDURE — 2580000003 HC RX 258: Performed by: INTERNAL MEDICINE

## 2020-06-08 PROCEDURE — 71045 X-RAY EXAM CHEST 1 VIEW: CPT

## 2020-06-08 PROCEDURE — 2060000000 HC ICU INTERMEDIATE R&B

## 2020-06-08 PROCEDURE — 6360000002 HC RX W HCPCS: Performed by: INTERNAL MEDICINE

## 2020-06-08 PROCEDURE — 85027 COMPLETE CBC AUTOMATED: CPT

## 2020-06-08 PROCEDURE — 6370000000 HC RX 637 (ALT 250 FOR IP)

## 2020-06-08 PROCEDURE — 93005 ELECTROCARDIOGRAM TRACING: CPT | Performed by: INTERNAL MEDICINE

## 2020-06-08 PROCEDURE — 2500000003 HC RX 250 WO HCPCS

## 2020-06-08 PROCEDURE — 02H63JZ INSERTION OF PACEMAKER LEAD INTO RIGHT ATRIUM, PERCUTANEOUS APPROACH: ICD-10-PCS | Performed by: INTERNAL MEDICINE

## 2020-06-08 RX ORDER — ACETAMINOPHEN 325 MG/1
650 TABLET ORAL EVERY 4 HOURS PRN
Status: DISCONTINUED | OUTPATIENT
Start: 2020-06-08 | End: 2020-06-09 | Stop reason: HOSPADM

## 2020-06-08 RX ADMIN — CEFAZOLIN 1 G: 1 INJECTION, POWDER, FOR SOLUTION INTRAMUSCULAR; INTRAVENOUS at 22:30

## 2020-06-08 RX ADMIN — TIZANIDINE 4 MG: 4 TABLET ORAL at 04:09

## 2020-06-08 RX ADMIN — METFORMIN HYDROCHLORIDE 1000 MG: 500 TABLET ORAL at 17:25

## 2020-06-08 RX ADMIN — ACETAMINOPHEN 650 MG: 325 TABLET, FILM COATED ORAL at 04:13

## 2020-06-08 RX ADMIN — ACETAMINOPHEN 650 MG: 325 TABLET ORAL at 18:32

## 2020-06-08 RX ADMIN — TRAMADOL HYDROCHLORIDE 50 MG: 50 TABLET, FILM COATED ORAL at 13:09

## 2020-06-08 RX ADMIN — TIZANIDINE 4 MG: 4 TABLET ORAL at 09:41

## 2020-06-08 RX ADMIN — TIZANIDINE 4 MG: 4 TABLET ORAL at 17:25

## 2020-06-08 RX ADMIN — TRAMADOL HYDROCHLORIDE 50 MG: 50 TABLET, FILM COATED ORAL at 22:29

## 2020-06-08 RX ADMIN — DILTIAZEM HYDROCHLORIDE 120 MG: 120 CAPSULE, COATED, EXTENDED RELEASE ORAL at 18:27

## 2020-06-08 RX ADMIN — TIZANIDINE 4 MG: 4 TABLET ORAL at 22:29

## 2020-06-08 RX ADMIN — ROSUVASTATIN CALCIUM 20 MG: 5 TABLET, FILM COATED ORAL at 18:25

## 2020-06-08 RX ADMIN — LATANOPROST 1 DROP: 50 SOLUTION OPHTHALMIC at 18:34

## 2020-06-08 RX ADMIN — LOSARTAN POTASSIUM 25 MG: 25 TABLET, FILM COATED ORAL at 18:27

## 2020-06-08 RX ADMIN — TRAMADOL HYDROCHLORIDE 50 MG: 50 TABLET, FILM COATED ORAL at 05:13

## 2020-06-08 RX ADMIN — GABAPENTIN 100 MG: 100 CAPSULE ORAL at 18:26

## 2020-06-08 RX ADMIN — ACETAMINOPHEN 650 MG: 325 TABLET, FILM COATED ORAL at 08:29

## 2020-06-08 RX ADMIN — DEXTROSE MONOHYDRATE 1 G: 5 INJECTION INTRAVENOUS at 15:14

## 2020-06-08 ASSESSMENT — PAIN SCALES - GENERAL
PAINLEVEL_OUTOF10: 0
PAINLEVEL_OUTOF10: 10
PAINLEVEL_OUTOF10: 3
PAINLEVEL_OUTOF10: 2
PAINLEVEL_OUTOF10: 8
PAINLEVEL_OUTOF10: 10
PAINLEVEL_OUTOF10: 8
PAINLEVEL_OUTOF10: 6

## 2020-06-08 ASSESSMENT — PAIN DESCRIPTION - FREQUENCY: FREQUENCY: CONTINUOUS

## 2020-06-08 ASSESSMENT — PAIN DESCRIPTION - PAIN TYPE: TYPE: CHRONIC PAIN

## 2020-06-08 ASSESSMENT — PAIN DESCRIPTION - LOCATION: LOCATION: BACK

## 2020-06-08 ASSESSMENT — PAIN DESCRIPTION - DESCRIPTORS: DESCRIPTORS: ACHING

## 2020-06-08 NOTE — PLAN OF CARE
Problem: Pain:  Description: Pain management should include both nonpharmacologic and pharmacologic interventions. Goal: Pain level will decrease  Description: Pain level will decrease  Outcome: Ongoing  Goal: Control of acute pain  Description: Control of acute pain  Outcome: Ongoing  Goal: Control of chronic pain  Description: Control of chronic pain  Outcome: Ongoing     Problem: Falls - Risk of:  Goal: Will remain free from falls  Description: Will remain free from falls  Outcome: Ongoing  Goal: Absence of physical injury  Description: Absence of physical injury  Outcome: Ongoing     Problem: Cardiac Output - Decreased:  Goal: Hemodynamic stability will improve  Description: Hemodynamic stability will improve  Outcome: Ongoing     Problem: Pain:  Description: Pain management should include both nonpharmacologic and pharmacologic interventions.   Goal: Recognizes and communicates pain  Description: Recognizes and communicates pain  Outcome: Ongoing  Goal: Control of acute pain  Description: Control of acute pain  Outcome: Ongoing  Goal: Control of chronic pain  Description: Control of chronic pain  Outcome: Ongoing
Problem: Pain:  Description: Pain management should include both nonpharmacologic and pharmacologic interventions. Goal: Pain level will decrease  Description: Pain level will decrease  Outcome: Ongoing  Goal: Control of acute pain  Description: Control of acute pain  Outcome: Ongoing  Goal: Control of chronic pain  Description: Control of chronic pain  Outcome: Ongoing     Problem: Falls - Risk of:  Goal: Will remain free from falls  Description: Will remain free from falls  Outcome: Ongoing  Goal: Absence of physical injury  Description: Absence of physical injury  Outcome: Ongoing     Problem: Cardiac Output - Decreased:  Goal: Hemodynamic stability will improve  Description: Hemodynamic stability will improve  Outcome: Ongoing     Problem: Pain:  Description: Pain management should include both nonpharmacologic and pharmacologic interventions.   Goal: Recognizes and communicates pain  Description: Recognizes and communicates pain  Outcome: Ongoing  Goal: Control of acute pain  Description: Control of acute pain  Outcome: Ongoing  Goal: Control of chronic pain  Description: Control of chronic pain  Outcome: Ongoing
RN  Outcome: Ongoing

## 2020-06-09 ENCOUNTER — APPOINTMENT (OUTPATIENT)
Dept: ULTRASOUND IMAGING | Age: 75
DRG: 242 | End: 2020-06-09
Payer: MEDICARE

## 2020-06-09 VITALS
BODY MASS INDEX: 29.23 KG/M2 | TEMPERATURE: 98.8 F | HEIGHT: 66 IN | RESPIRATION RATE: 16 BRPM | DIASTOLIC BLOOD PRESSURE: 60 MMHG | HEART RATE: 84 BPM | SYSTOLIC BLOOD PRESSURE: 152 MMHG | WEIGHT: 181.88 LBS | OXYGEN SATURATION: 97 %

## 2020-06-09 LAB
EKG ATRIAL RATE: 0 BPM
EKG ATRIAL RATE: 60 BPM
EKG ATRIAL RATE: 76 BPM
EKG P AXIS: 68 DEGREES
EKG P-R INTERVAL: 152 MS
EKG Q-T INTERVAL: 284 MS
EKG Q-T INTERVAL: 434 MS
EKG Q-T INTERVAL: 546 MS
EKG QRS DURATION: 104 MS
EKG QRS DURATION: 144 MS
EKG QRS DURATION: 178 MS
EKG QTC CALCULATION (BAZETT): 411 MS
EKG QTC CALCULATION (BAZETT): 488 MS
EKG QTC CALCULATION (BAZETT): 558 MS
EKG R AXIS: -34 DEGREES
EKG R AXIS: -61 DEGREES
EKG R AXIS: 56 DEGREES
EKG T AXIS: -81 DEGREES
EKG T AXIS: 119 DEGREES
EKG T AXIS: 91 DEGREES
EKG VENTRICULAR RATE: 126 BPM
EKG VENTRICULAR RATE: 63 BPM
EKG VENTRICULAR RATE: 76 BPM
GLUCOSE BLD-MCNC: 154 MG/DL (ref 60–115)
GLUCOSE BLD-MCNC: 162 MG/DL (ref 60–115)
GLUCOSE BLD-MCNC: 171 MG/DL (ref 60–115)
HCT VFR BLD CALC: 38.5 % (ref 37–47)
HEMOGLOBIN: 12.8 G/DL (ref 12–16)
MCH RBC QN AUTO: 30 PG (ref 27–31.3)
MCHC RBC AUTO-ENTMCNC: 33.3 % (ref 33–37)
MCV RBC AUTO: 90.2 FL (ref 82–100)
PDW BLD-RTO: 12.9 % (ref 11.5–14.5)
PERFORMED ON: ABNORMAL
PLATELET # BLD: 319 K/UL (ref 130–400)
RBC # BLD: 4.26 M/UL (ref 4.2–5.4)
WBC # BLD: 10.1 K/UL (ref 4.8–10.8)

## 2020-06-09 PROCEDURE — 6370000000 HC RX 637 (ALT 250 FOR IP): Performed by: INTERNAL MEDICINE

## 2020-06-09 PROCEDURE — 93010 ELECTROCARDIOGRAM REPORT: CPT | Performed by: INTERNAL MEDICINE

## 2020-06-09 PROCEDURE — 6360000002 HC RX W HCPCS: Performed by: INTERNAL MEDICINE

## 2020-06-09 PROCEDURE — 36415 COLL VENOUS BLD VENIPUNCTURE: CPT

## 2020-06-09 PROCEDURE — 85027 COMPLETE CBC AUTOMATED: CPT

## 2020-06-09 PROCEDURE — 76706 US ABDL AORTA SCREEN AAA: CPT | Performed by: INTERNAL MEDICINE

## 2020-06-09 PROCEDURE — C1887 CATHETER, GUIDING: HCPCS

## 2020-06-09 PROCEDURE — 76706 US ABDL AORTA SCREEN AAA: CPT

## 2020-06-09 PROCEDURE — 2580000003 HC RX 258: Performed by: INTERNAL MEDICINE

## 2020-06-09 PROCEDURE — C1894 INTRO/SHEATH, NON-LASER: HCPCS

## 2020-06-09 PROCEDURE — 33210 INSERT ELECTRD/PM CATH SNGL: CPT | Performed by: INTERNAL MEDICINE

## 2020-06-09 PROCEDURE — 93005 ELECTROCARDIOGRAM TRACING: CPT | Performed by: INTERNAL MEDICINE

## 2020-06-09 PROCEDURE — 99239 HOSP IP/OBS DSCHRG MGMT >30: CPT | Performed by: INTERNAL MEDICINE

## 2020-06-09 RX ORDER — APIXABAN 5 MG/1
5 TABLET, FILM COATED ORAL 2 TIMES DAILY
Qty: 60 TABLET | Refills: 3 | Status: SHIPPED
Start: 2020-06-11

## 2020-06-09 RX ADMIN — CEFAZOLIN 1 G: 1 INJECTION, POWDER, FOR SOLUTION INTRAMUSCULAR; INTRAVENOUS at 06:24

## 2020-06-09 RX ADMIN — METFORMIN HYDROCHLORIDE 1000 MG: 500 TABLET ORAL at 08:37

## 2020-06-09 RX ADMIN — TRAMADOL HYDROCHLORIDE 50 MG: 50 TABLET, FILM COATED ORAL at 06:40

## 2020-06-09 RX ADMIN — ONDANSETRON 4 MG: 2 INJECTION INTRAMUSCULAR; INTRAVENOUS at 03:38

## 2020-06-09 RX ADMIN — GLIPIZIDE 2.5 MG: 5 TABLET ORAL at 06:24

## 2020-06-09 RX ADMIN — ENOXAPARIN SODIUM 30 MG: 30 INJECTION SUBCUTANEOUS at 08:38

## 2020-06-09 RX ADMIN — ACETAMINOPHEN 650 MG: 325 TABLET ORAL at 03:43

## 2020-06-09 RX ADMIN — TIZANIDINE 4 MG: 4 TABLET ORAL at 06:40

## 2020-06-09 RX ADMIN — TRAMADOL HYDROCHLORIDE 50 MG: 50 TABLET, FILM COATED ORAL at 13:36

## 2020-06-09 RX ADMIN — MAGNESIUM OXIDE TAB 400 MG (241.3 MG ELEMENTAL MG) 300 MG: 400 (241.3 MG) TAB at 08:37

## 2020-06-09 ASSESSMENT — PAIN SCALES - GENERAL
PAINLEVEL_OUTOF10: 8
PAINLEVEL_OUTOF10: 8
PAINLEVEL_OUTOF10: 9

## 2020-06-09 ASSESSMENT — PAIN DESCRIPTION - PAIN TYPE: TYPE: CHRONIC PAIN

## 2020-06-09 ASSESSMENT — PAIN DESCRIPTION - LOCATION: LOCATION: BACK

## 2020-06-09 NOTE — DISCHARGE SUMMARY
Diagnostics:   Radiology: Xr Chest Portable    Result Date: 2020  Patient MRN: [id number] : 1945 Age:  67 years Gender: Female Order Date: 2020 4:45 PM. Exam: XR CHEST PORTABLE Number of Views: 1 Indication:  Fall at home yesterday with right-sided chest pain. Comparison: None. Findings: Cardiac mediastinal silhouette is prominent. No pneumothorax, infiltrate/pneumonia pneumothorax. No definitive displaced rib fracture on limited rib evaluation. Impression:  Prominent cardiomediastinal silhouette. No acute cardiopulmonary process definitively identified. No definitive rib fracture on limited fracture evaluation.        Labs:   Recent Results (from the past 72 hour(s))   POCT Glucose    Collection Time: 20  4:48 PM   Result Value Ref Range    POC Glucose 100 60 - 115 mg/dl    Performed on ACCU-CHEK    POCT Glucose    Collection Time: 20  8:48 PM   Result Value Ref Range    POC Glucose 175 (H) 60 - 115 mg/dl    Performed on ACCU-CHEK    POCT Glucose    Collection Time: 20  7:45 AM   Result Value Ref Range    POC Glucose 146 (H) 60 - 115 mg/dl    Performed on ACCU-CHEK    POCT Glucose    Collection Time: 20 11:01 AM   Result Value Ref Range    POC Glucose 141 (H) 60 - 115 mg/dl    Performed on ACCU-CHEK    POCT Glucose    Collection Time: 20  4:17 PM   Result Value Ref Range    POC Glucose 111 60 - 115 mg/dl    Performed on ACCU-CHEK    POCT Glucose    Collection Time: 20  9:03 PM   Result Value Ref Range    POC Glucose 126 (H) 60 - 115 mg/dl    Performed on ACCU-CHEK    POCT Glucose    Collection Time: 20  9:07 AM   Result Value Ref Range    POC Glucose 145 (H) 60 - 115 mg/dl    Performed on ACCU-CHEK    POCT Glucose    Collection Time: 20 12:44 PM   Result Value Ref Range    POC Glucose 167 (H) 60 - 115 mg/dl    Performed on ACCU-CHEK    POCT Glucose    Collection Time: 20  5:14 PM   Result Value Ref Range    POC Glucose 112 60 - 115 mg/dl

## 2020-06-10 ENCOUNTER — CARE COORDINATION (OUTPATIENT)
Dept: CASE MANAGEMENT | Age: 75
End: 2020-06-10

## 2020-06-10 PROCEDURE — 1111F DSCHRG MED/CURRENT MED MERGE: CPT | Performed by: INTERNAL MEDICINE

## 2020-06-10 NOTE — CARE COORDINATION
[place] [facility] Transitions Initial BPCI-A Follow Up Call    Call within 2 business days of discharge: Yes    Patient: Thien Crew Patient : 1945   MRN: [id number]  Reason for Admission: -2020 [address] Near syncope, High-grade AV block, PM placement. Discharge Date: 20 RARS: Readmission Risk Score: 11      Last Discharge [facility]       Complaint Diagnosis Description Type Department Provider    20 Loss of Consciousness Bradycardia . .. ED to Hosp-Admission (Discharged) (ADMITTED) Laurent Mckoy MD; Ghulam Selby MD           Spoke with: Orion Moscoso. Reports incisional tenderness/pain. Wearing sling and restates understanding to avoid lifting elbow above shoulder level. Denies any incision site increase in redness or swelling and states no drainage. Denies fever, chills, or flu-like symptoms. Denies any home needs. Facility: [facility]    [facility] dinerot  9:00. Dr Garcia Evans  11:30. Med rec/1111F order completed. Non-face-to-face services provided:  Obtained and reviewed discharge summary and/or continuity of care documents. Reviewed symptoms to report to surgeon. Reviewed CV-19 symptoms to call physician.     Care Transitions 24 Hour Call    Do you have any ongoing symptoms?:  Yes  Patient-reported symptoms:  Pain (Comment: Incision site pain.)  Do you have a copy of your discharge instructions?:  Yes  Do you have all of your prescriptions and are they filled?:  Yes  Have you been contacted by a [address]?:  No  Have you scheduled your follow up appointment?:  Yes  Were you discharged with any Home Care or Post Acute Services:  No  Do you feel like you have everything you need to keep you well at home?:  Yes  Care Transitions Interventions         Follow Up  Future Appointments   Date Time Provider Tomeka Heredia   2020 11:30 AM Jasbir Escobedo  [facility], [address] [place] [address]

## 2020-06-15 NOTE — PROCEDURES
[address]                      [address], [address] [facility]                                 PROCEDURE NOTE    PATIENT NAME: Jordi Arcos                  :        1945  MED REC NO:   [id number]                            ROOM:       [id number]  ACCOUNT NO:   [de-identified]                           ADMIT DATE: 2020  PROVIDER:     Arnaldo Shelby DO    DATE OF PROCEDURE:  2020    PROCEDURE PERFORMED:  Dual chamber permanent pacemaker placement. INDICATIONS:  Sick sinus syndrome, symptomatic bradycardia. PROCEDURE PERFORMED BY:  Rob Shelby DO    COMPLICATIONS:  None. ACCESS SITE:  Left subclavian site for the implant location. DESCRIPTION OF PROCEDURE:  After the risks, benefits and alternatives of  the above mentioned procedure were explained in detail with the patient,  informed consent was obtained verbally and in writing, and placed in the  chart. The patient was taken to the cardiac cath lab suite where they  were sterilely prepped and draped in the usual fashion. Lidocaine 1%  was used to anesthetize the left subclavian area. A thin-walled 18-gauge Argon needle was used to cannulate the left  subclavian vein. A guidewire was inserted through the needle into the  vascular lumen under fluoroscopic guidance. The needle was removed. Another thin-walled 18-gauge Argon needle was used to cannulate the left  subclavian vein. A guidewire was through the needle into the vascular  lumen under fluoroscopic guidance. The needle was removed and both  guidewires were attached to the field. A 1.5 inch incision was made  utilizing a #15 blade in the left subclavian side. Hemostasis was made  complete. Electrocautery along with digital blunt dissection was  utilized to dissect to the level of the pectoralis muscle fascia. Dissection was carried cephalad along the pectoralis muscle plane.    Digital blunt dissection was used to create a pocket large enough to  accommodate the generator. A venous sheath and dilator were flushed  with heparinized saline and advanced over the guidewire into the  vascular lumen under fluoroscopic guidance. The dilator and guidewire  were then removed. A right ventricular bipolar lead was inserted into the sheath and  advanced under fluoroscopic guidance into the right atrium. The sheath  was then torn away under direct visualization. The lead was inserted  into the right ventricle under fluoroscopic guidance. Adequate sensing  and pacing thresholds were achieved and the lead was screwed into place. The lead collar was advanced and anchored into place utilizing #0 silk  suture in an interrupted stitch fashion. Next, another venous sheath and dilator were flushed with heparinized  saline and advanced under fluoroscopic guidance into the vascular lumen  over the guidewire. After removing the dilator and guidewire, a right  atrial bipolar lead was inserted into the sheath and advanced under  fluoroscopic guidance into the right atrium. The sheath was then torn  away in a standard fashion and the lead was positioned into the right  atrial appendage. Adequate sensing and pacing thresholds were then  achieved and the lead was then screwed into place. The lead collar was  advanced and anchored into place utilizing #0 silk suture in an  interrupted stitch fashion. The leads were then inserted into the appropriate position into the  generator. They were then secured with the set screw provided. The  leads and generator were inserted into the pocket with the leads  posterior. Subcutaneous tissue was approximated utilizing #2-0 chromic  gut suture in an interrupted stitch fashion. The dermal layer was  approximated utilizing #4-0 Vicryl in a continuous subcuticular stitch. The area was then cleansed with sterile saline and dried. TinCoBen was  applied and Steri-strips were overlaid.   A sterile 4 x 4

## 2020-06-15 NOTE — PROCEDURES
[address]                      [address], [address] [facility]                                 PROCEDURE NOTE    PATIENT NAME: José Maurer                  :        1945  MED REC NO:   [id number]                            ROOM:       [id number]  ACCOUNT NO:   [de-identified]                           ADMIT DATE: 2020  PROVIDER:     Emma Barnett DO    DATE OF PROCEDURE:  2020    PROCEDURE PERFORMED:  Temporary transvenous pacemaker placement. INDICATIONS:  Sick sinus syndrome, symptomatic bradycardia. PROCEDURE PERFORMED BY:  Matthew Barnett DO    COMPLICATIONS:  None. ACCESS SITE:  Right common femoral vein. DESCRIPTION OF PROCEDURE:  The patient was brought to the cardiac cath  lab suite, where she was sterilely prepped and draped in usual fashion. 1% lidocaine was used to anesthetize the right inguinal area and a  6-Thai venous sheath was placed without any difficulty utilizing the  micropuncture kit. Next, a balloon-tipped transvenous pacemaker  catheter was placed in the RV apex and adequate pacing thresholds were  achieved. Sheath as well as the catheter was secured in the groin area  and the patient was transferred to the Intensive Care Unit in stable and  satisfactory condition. ASSESSMENT:  Status post successful temporary transvenous pacemaker  placement. PLAN:  1. Postprocedure care as usual.  2.  ICU supportive care and management. 3.  Monitoring on telemetry. 4.  Permanent pacemaker placement when feasible.   5.  Check 2D echocardiogram.        MATTHEW BARNETT DO    D: 06/15/2020 9:18:10       T: 06/15/2020 9:54:44     [email or web address]  Job#: [id number]     Doc#: [id number]    CC:

## 2020-06-17 ENCOUNTER — CARE COORDINATION (OUTPATIENT)
Dept: CASE MANAGEMENT | Age: 75
End: 2020-06-17

## 2020-06-17 ENCOUNTER — OFFICE VISIT (OUTPATIENT)
Dept: CARDIOLOGY CLINIC | Age: 75
End: 2020-06-17
Payer: MEDICARE

## 2020-06-17 VITALS
OXYGEN SATURATION: 96 % | HEIGHT: 66 IN | SYSTOLIC BLOOD PRESSURE: 126 MMHG | RESPIRATION RATE: 20 BRPM | HEART RATE: 74 BPM | BODY MASS INDEX: 28.12 KG/M2 | DIASTOLIC BLOOD PRESSURE: 84 MMHG | WEIGHT: 175 LBS

## 2020-06-17 PROCEDURE — 99214 OFFICE O/P EST MOD 30 MIN: CPT | Performed by: INTERNAL MEDICINE

## 2020-06-17 PROCEDURE — 4040F PNEUMOC VAC/ADMIN/RCVD: CPT | Performed by: INTERNAL MEDICINE

## 2020-06-17 PROCEDURE — G8427 DOCREV CUR MEDS BY ELIG CLIN: HCPCS | Performed by: INTERNAL MEDICINE

## 2020-06-17 PROCEDURE — 1036F TOBACCO NON-USER: CPT | Performed by: INTERNAL MEDICINE

## 2020-06-17 PROCEDURE — 1090F PRES/ABSN URINE INCON ASSESS: CPT | Performed by: INTERNAL MEDICINE

## 2020-06-17 PROCEDURE — 1111F DSCHRG MED/CURRENT MED MERGE: CPT | Performed by: INTERNAL MEDICINE

## 2020-06-17 PROCEDURE — G8417 CALC BMI ABV UP PARAM F/U: HCPCS | Performed by: INTERNAL MEDICINE

## 2020-06-17 PROCEDURE — 1123F ACP DISCUSS/DSCN MKR DOCD: CPT | Performed by: INTERNAL MEDICINE

## 2020-06-17 PROCEDURE — G8399 PT W/DXA RESULTS DOCUMENT: HCPCS | Performed by: INTERNAL MEDICINE

## 2020-06-17 ASSESSMENT — ENCOUNTER SYMPTOMS
ABDOMINAL DISTENTION: 0
ANAL BLEEDING: 0
NAUSEA: 0
BLOOD IN STOOL: 0
APNEA: 0
VOICE CHANGE: 0
FACIAL SWELLING: 0
VOMITING: 0
SHORTNESS OF BREATH: 0
CHEST TIGHTNESS: 0
TROUBLE SWALLOWING: 0
COLOR CHANGE: 0
WHEEZING: 0
DIARRHEA: 0

## 2020-06-17 NOTE — PROGRESS NOTES
\"On Tuesday morning I woke up with a bruise on my chest right here and lump and I went to the doctor on Friday and he suggested I get an ultrasound.  I have one schedule for next Friday, but meanwhile I just got another one on the back of my left leg so I thought I should just come in.\"
All other systems reviewed and are negative. Review of System is negative except for as mentioned above. Physical Examination:    /84 (Site: Right Upper Arm, Position: Sitting, Cuff Size: Medium Adult)   Pulse 74   Resp 20   Ht 5' 6\" (1.676 m)   Wt 175 lb (79.4 kg)   SpO2 96%   BMI 28.25 kg/m²    Physical Exam   Constitutional: She appears healthy. No distress. HENT:   Nose: Nose normal.   Mouth/Throat: Dentition is normal. Oropharynx is clear. Eyes: Pupils are equal, round, and reactive to light. Conjunctivae are normal.   Neck: Normal range of motion and thyroid normal. Neck supple. Cardiovascular: Regular rhythm, S1 normal, S2 normal, normal heart sounds, intact distal pulses and normal pulses. PMI is not displaced. No murmur heard. Pulmonary/Chest: She has no wheezes. She has no rales. She exhibits no tenderness. Abdominal: Soft. Bowel sounds are normal. She exhibits no distension and no mass. There is no splenomegaly or hepatomegaly. There is no abdominal tenderness. No hernia. Neurological: She is alert and oriented to person, place, and time. She has normal motor skills. Gait normal.   Skin: Skin is warm and dry. No cyanosis. No jaundice. Nails show no clubbing.            Patient Active Problem List   Diagnosis    Herniated lumbar disc without myelopathy    Lumbar degenerative disc disease    Spinal stenosis, lumbar region, without neurogenic claudication    Lumbosacral spondylosis without myelopathy    Foraminal stenosis of lumbar region    Lumbar spondylosis    PAF (paroxysmal atrial fibrillation) ([address]    Diabetes mellitus type 2, uncomplicated (HCC)    Paralytic ileus (HCC)    Symptomatic bradycardia    SSS (sick sinus syndrome) ([address]         Permanent pacemaker        Anticoagulated        Fracture a few ribs on the left side when she fell down with syncope        Calcified abdominal aorta        Orders Placed This Encounter   Procedures    CT Abdominal

## 2020-06-22 ENCOUNTER — TELEPHONE (OUTPATIENT)
Dept: CARDIOLOGY CLINIC | Age: 75
End: 2020-06-22

## 2020-06-25 ENCOUNTER — HOSPITAL ENCOUNTER (OUTPATIENT)
Dept: CT IMAGING | Age: 75
Discharge: HOME | End: 2020-06-27
Payer: MEDICARE

## 2020-06-25 PROCEDURE — 74177 CT ABD & PELVIS W/CONTRAST: CPT

## 2020-06-25 PROCEDURE — 6360000004 HC RX CONTRAST MEDICATION: Performed by: INTERNAL MEDICINE

## 2020-06-25 RX ADMIN — IOPAMIDOL 100 ML: 755 INJECTION, SOLUTION INTRAVENOUS at 09:08

## 2020-07-08 ENCOUNTER — TELEPHONE (OUTPATIENT)
Dept: CARDIOLOGY CLINIC | Age: 75
End: 2020-07-08

## 2020-07-08 NOTE — TELEPHONE ENCOUNTER
----- Message from Jose Goss sent at 6/23/2020  3:13 PM EDT -----  Regarding: RESULTS  CT SCAN RESULTS 6/25/2020

## 2020-07-08 NOTE — TELEPHONE ENCOUNTER
PER DR. CORONA:    CT SCAN OF ABDOMEN IS OK AT THIS TIME    PATIENT TO CALL IF ANYTHING ELSE COMES UP    SHE IS OK TO DRIVE        LMOM TO CB

## 2020-07-28 ENCOUNTER — OFFICE VISIT (OUTPATIENT)
Dept: CARDIOLOGY CLINIC | Age: 75
End: 2020-07-28
Payer: MEDICARE

## 2020-07-28 VITALS
DIASTOLIC BLOOD PRESSURE: 82 MMHG | RESPIRATION RATE: 22 BRPM | HEART RATE: 87 BPM | BODY MASS INDEX: 28.8 KG/M2 | OXYGEN SATURATION: 99 % | WEIGHT: 179.2 LBS | HEIGHT: 66 IN | SYSTOLIC BLOOD PRESSURE: 134 MMHG

## 2020-07-28 PROCEDURE — 1123F ACP DISCUSS/DSCN MKR DOCD: CPT | Performed by: INTERNAL MEDICINE

## 2020-07-28 PROCEDURE — G8417 CALC BMI ABV UP PARAM F/U: HCPCS | Performed by: INTERNAL MEDICINE

## 2020-07-28 PROCEDURE — 4040F PNEUMOC VAC/ADMIN/RCVD: CPT | Performed by: INTERNAL MEDICINE

## 2020-07-28 PROCEDURE — 1090F PRES/ABSN URINE INCON ASSESS: CPT | Performed by: INTERNAL MEDICINE

## 2020-07-28 PROCEDURE — 99214 OFFICE O/P EST MOD 30 MIN: CPT | Performed by: INTERNAL MEDICINE

## 2020-07-28 PROCEDURE — G8399 PT W/DXA RESULTS DOCUMENT: HCPCS | Performed by: INTERNAL MEDICINE

## 2020-07-28 PROCEDURE — G8427 DOCREV CUR MEDS BY ELIG CLIN: HCPCS | Performed by: INTERNAL MEDICINE

## 2020-07-28 PROCEDURE — 1036F TOBACCO NON-USER: CPT | Performed by: INTERNAL MEDICINE

## 2020-07-28 ASSESSMENT — ENCOUNTER SYMPTOMS
ABDOMINAL DISTENTION: 0
APNEA: 0
FACIAL SWELLING: 0
NAUSEA: 0
BLOOD IN STOOL: 0
VOICE CHANGE: 0
SHORTNESS OF BREATH: 0
TROUBLE SWALLOWING: 0
ANAL BLEEDING: 0
COLOR CHANGE: 0
WHEEZING: 0
CHEST TIGHTNESS: 0
DIARRHEA: 0
VOMITING: 0

## 2020-07-28 NOTE — PROGRESS NOTES
[facility] CARDIOLOGY OFFICE FOLLOW-UP      Patient: Sadia Guillaume  YOB: 1945  MRN: [id number]    Chief Complaint:  Chief Complaint   Patient presents with    1 Month Follow-Up     SSS    Atrial Fibrillation         Subjective/HPI:  7/28/2020: Patient presents today for follow-up of pacemaker. She had presented to ER with high-grade AV block and had a temporary pacemaker followed by permanent pacemaker. She has some back issues. Now after all this happened back is much better. Few days ago she had sharp chest pain. Not cardiac at all. She is supposed to see Rosamond Schaumann at the pacemaker clinic in about a month very apprehensive. On Eliquis for A. fib. And Cardizem 120 a day. She is also diabetic. On metformin and glipizide. She does not want to take the Neurontin. The pain is gone and she does not want to take it. I told her that was between her and Dr. Lynette Barrios. Long discussion. I told her she needs to relax. No restriction to activity. She can lift about 15 to 20 pounds. She will see me in 3 months. 6/17/2020: Patient presents today for follow-up of recent admission for complete heart block. Had a emergent temporary pacemaker followed by permanent pacemaker. She has had 3 back surgeries by Dr. Merlinda Floro. Now being followed by Dr. Lynette Barrios. She has history of chronic atrial fibrillation anticoagulated and was on Cardizem 120 at night. This was restarted after the permanent pacemaker was inserted. When she fell down she broke some ribs and she has ecchymosis on the left side still. She had a routine chest x-ray done by  suggested that the patient had calcified aorta. She had abdominal aorta ultrasound and we cannot track the report. Juana Raquelirais had wanted a CAT scan of the abdomen 12 will order to be done at [facility] and she will see me in the [place] office.   We will also hook her up with the pacemaker clinic           Past Medical History:   Diagnosis Date    A-fib ([facility]     and leg swelling. Gastrointestinal: Negative for abdominal distention, anal bleeding, blood in stool, diarrhea, nausea and vomiting. Genitourinary: Negative for decreased urine volume and dysuria. Musculoskeletal: Negative for gait problem, myalgias, neck pain and neck stiffness. Skin: Negative for color change, pallor, rash and wound. Neurological: Negative for dizziness, seizures, syncope, facial asymmetry, weakness, light-headedness, numbness and headaches. Hematological: Does not bruise/bleed easily. Psychiatric/Behavioral: Negative for agitation, behavioral problems, confusion, hallucinations and suicidal ideas. The patient is not nervous/anxious. All other systems reviewed and are negative. Review of System is negative except for as mentioned above. Physical Examination:    /82 (Site: Left Upper Arm, Position: Sitting, Cuff Size: Medium Adult)   Pulse 87   Resp 22   Ht 5' 6\" (1.676 m)   Wt 179 lb 3.2 oz (81.3 kg)   SpO2 99%   BMI 28.92 kg/m²    Physical Exam   Constitutional: She appears healthy. No distress. HENT:   Nose: Nose normal.   Mouth/Throat: Dentition is normal. Oropharynx is clear. Eyes: Pupils are equal, round, and reactive to light. Conjunctivae are normal.   Neck: Normal range of motion and thyroid normal. Neck supple. Cardiovascular: Regular rhythm, S1 normal, S2 normal, normal heart sounds, intact distal pulses and normal pulses. PMI is not displaced. No murmur heard. Pulmonary/Chest: She has no wheezes. She has no rales. She exhibits no tenderness. Abdominal: Soft. Bowel sounds are normal. She exhibits no distension and no mass. There is no splenomegaly or hepatomegaly. There is no abdominal tenderness. No hernia. Neurological: She is alert and oriented to person, place, and time. She has normal motor skills. Gait normal.   Skin: Skin is warm and dry. No cyanosis. No jaundice. Nails show no clubbing.            Patient Active Problem List Diagnosis    Herniated lumbar disc without myelopathy    Lumbar degenerative disc disease    Spinal stenosis, lumbar region, without neurogenic claudication    Lumbosacral spondylosis without myelopathy    Foraminal stenosis of lumbar region    Lumbar spondylosis    PAF (paroxysmal atrial fibrillation) ([address]    Diabetes mellitus type 2, uncomplicated (HCC)    Paralytic ileus ([address]    Symptomatic bradycardia    SSS (sick sinus syndrome) ([address]           No orders of the defined types were placed in this encounter. No orders of the defined types were placed in this encounter. Assessment:    1. PAF (paroxysmal atrial fibrillation) ([address]    2. SSS (sick sinus syndrome) ([address]    3. Pacemaker       Plan:     Stay on same medications. See me in 3 months. This note was partially generated using Dragon voice recognition system, and there may be some incorrect words, spellings, punctuation that were not noticed in checking the note before saving.         Electronically signed by Kevin Fraire MD on 7/28/2020 at 3:51 PM

## 2020-07-29 ENCOUNTER — TELEPHONE (OUTPATIENT)
Dept: CARDIOLOGY CLINIC | Age: 75
End: 2020-07-29

## 2020-07-29 NOTE — TELEPHONE ENCOUNTER
Patient calling to inform you that she no longer is going to see DR Juan C Schroeder and would like you to take over her whole heart care

## 2020-08-14 ENCOUNTER — HOSPITAL ENCOUNTER (OUTPATIENT)
Dept: CARDIOLOGY | Age: 75
Discharge: HOME | End: 2020-08-14
Payer: MEDICARE

## 2020-08-14 PROCEDURE — 93280 PM DEVICE PROGR EVAL DUAL: CPT

## 2020-11-03 ENCOUNTER — OFFICE VISIT (OUTPATIENT)
Dept: CARDIOLOGY CLINIC | Age: 75
End: 2020-11-03
Payer: MEDICARE

## 2020-11-03 VITALS
OXYGEN SATURATION: 98 % | SYSTOLIC BLOOD PRESSURE: 124 MMHG | WEIGHT: 178 LBS | DIASTOLIC BLOOD PRESSURE: 82 MMHG | RESPIRATION RATE: 22 BRPM | BODY MASS INDEX: 28.61 KG/M2 | HEIGHT: 66 IN | HEART RATE: 82 BPM

## 2020-11-03 PROCEDURE — G8427 DOCREV CUR MEDS BY ELIG CLIN: HCPCS | Performed by: INTERNAL MEDICINE

## 2020-11-03 PROCEDURE — 1036F TOBACCO NON-USER: CPT | Performed by: INTERNAL MEDICINE

## 2020-11-03 PROCEDURE — 1123F ACP DISCUSS/DSCN MKR DOCD: CPT | Performed by: INTERNAL MEDICINE

## 2020-11-03 PROCEDURE — 4040F PNEUMOC VAC/ADMIN/RCVD: CPT | Performed by: INTERNAL MEDICINE

## 2020-11-03 PROCEDURE — 1090F PRES/ABSN URINE INCON ASSESS: CPT | Performed by: INTERNAL MEDICINE

## 2020-11-03 PROCEDURE — G8417 CALC BMI ABV UP PARAM F/U: HCPCS | Performed by: INTERNAL MEDICINE

## 2020-11-03 PROCEDURE — 99213 OFFICE O/P EST LOW 20 MIN: CPT | Performed by: INTERNAL MEDICINE

## 2020-11-03 PROCEDURE — G8399 PT W/DXA RESULTS DOCUMENT: HCPCS | Performed by: INTERNAL MEDICINE

## 2020-11-03 PROCEDURE — G8484 FLU IMMUNIZE NO ADMIN: HCPCS | Performed by: INTERNAL MEDICINE

## 2020-11-03 ASSESSMENT — ENCOUNTER SYMPTOMS
VOMITING: 0
CHEST TIGHTNESS: 0
DIARRHEA: 0
SHORTNESS OF BREATH: 0
APNEA: 0
BLOOD IN STOOL: 0
NAUSEA: 0

## 2020-11-03 NOTE — PROGRESS NOTES
[facility] CARDIOLOGY OFFICE FOLLOW-UP      Patient: Marisa Eduardo  YOB: 1945  MRN: [id number]    Chief Complaint:  Chief Complaint   Patient presents with    3 Month Follow-Up     SSS    Atrial Fibrillation         Subjective/HPI:  11/3/2020: Patient presents today for follow-up of pacemaker. She has presented to [facility] with atrial fibrillation with a slow ventricular rate and high-grade AV block. Had a temporary pacemaker followed by permanent pacemaker. She was supposed to back surgery. Has had 3 prior back surgery. Surprisingly after this hospitalization. The back pain is completely resolved. She used to see Dr. Ling Aldana at the [facility] but has decided to move her cardiology coverage to our practice. Doing much better. On Cardizem still gets occasional palpitation. She is on Eliquis. She will see me in 4 months no bleeding     7/28/2020: Patient presents today for follow-up of pacemaker. She had presented to ER with high-grade AV block and had a temporary pacemaker followed by permanent pacemaker. She has some back issues. Now after all this happened back is much better. Few days ago she had sharp chest pain. Not cardiac at all. She is supposed to see Macarena Mayers at the pacemaker clinic in about a month very apprehensive. On Eliquis for A. fib. And Cardizem 120 a day. She is also diabetic. On metformin and glipizide. She does not want to take the Neurontin. The pain is gone and she does not want to take it. I told her that was between her and Dr. Robin Mejia. Long discussion. I told her she needs to relax. No restriction to activity. She can lift about 15 to 20 pounds.   She will see me in 3 months.        6/17/2020: Patient presents today for follow-up of recent admission for complete heart block.  Had a emergent temporary pacemaker followed by permanent pacemaker.  She has had 3 back surgeries by Dr. Zachary Wyatt being followed by Dr. Gail Carpio has history of Smokeless tobacco: Never Used   Substance and Sexual Activity    Alcohol use:  Yes     Alcohol/week: 0.0 standard drinks     Comment: social rare    Drug use: No    Sexual activity: None   Lifestyle    Physical activity     Days per week: None     Minutes per session: None    Stress: None   Relationships    Social connections     Talks on phone: None     Gets together: None     Attends [employer] service: None     Active member of club or organization: None     Attends meetings of clubs or organizations: None     Relationship status: None    Intimate partner violence     Fear of current or ex partner: None     Emotionally abused: None     Physically abused: None     Forced sexual activity: None   Other Topics Concern    None   Social History Narrative    None       Allergies   Allergen Reactions    Other      Honeydew melon--throat closes & itching      Dilaudid [Hydromorphone Hcl] Rash       Current Outpatient Medications   Medication Sig Dispense Refill    ELIQUIS 5 MG TABS tablet Take 1 tablet by mouth 2 times daily OK to resume on Thursday 6/11/20 60 tablet 3    Cholecalciferol 50 MCG (2000 UT) CAPS Take 1 capsule by mouth daily      Coenzyme Q10 (COQ-10 PO) Take 200 mg by mouth daily      LATANOPROST OP Apply 1 drop to eye every evening Indications: OU       acetaminophen (TYLENOL) 325 MG tablet Take 650 mg by mouth every 6 hours as needed for Pain      docusate sodium (COLACE) 100 MG capsule Take 100 mg by mouth 2 times daily      diltiazem (DILTIAZEM CD) 120 MG extended release capsule Take 120 mg by mouth nightly       metFORMIN (GLUCOPHAGE) 1000 MG tablet Take 1,000 mg by mouth 2 times daily (with meals)      Biotin 1 MG CAPS Take 5,000 mcg by mouth daily       losartan (COZAAR) 25 MG tablet Take 25 mg by mouth nightly       rosuvastatin (CRESTOR) 10 MG tablet Take 20 mg by mouth nightly       glipiZIDE (GLUCOTROL XL) 2.5 MG extended release tablet Take 2.5 mg by mouth daily      magnesium (MAGNESIUM-OXIDE) 250 MG TABS tablet Take 250 mg by mouth daily      gabapentin (NEURONTIN) 100 MG capsule Take 1 capsule by mouth 3 times daily for 30 days. 90 capsule 0     No current facility-administered medications for this visit. Review of Systems:   Review of Systems   Constitutional: Negative for appetite change, diaphoresis and fatigue. HENT: Negative for nosebleeds. Respiratory: Negative for apnea, chest tightness and shortness of breath. Cardiovascular: Negative for chest pain, palpitations and leg swelling. Gastrointestinal: Negative for blood in stool, diarrhea, nausea and vomiting. Musculoskeletal: Negative for myalgias, neck pain and neck stiffness. Skin: Negative for color change, pallor, rash and wound. Neurological: Negative for dizziness, seizures, syncope, weakness, light-headedness, numbness and headaches. Hematological: Does not bruise/bleed easily. Psychiatric/Behavioral: Negative for agitation, behavioral problems and confusion. The patient is not nervous/anxious and is not hyperactive. All other systems reviewed and are negative. Review of System is negative except for as mentioned above. Physical Examination:    /82 (Site: Left Upper Arm, Position: Sitting, Cuff Size: Medium Adult)   Pulse 82   Resp 22   Ht 5' 6\" (1.676 m)   Wt 178 lb (80.7 kg)   SpO2 98%   BMI 28.73 kg/m²    Physical Exam   Constitutional: She appears healthy. HENT:   Nose: Nose normal.   Mouth/Throat: Dentition is normal. Oropharynx is clear. Eyes: Pupils are equal, round, and reactive to light. Neck: Normal range of motion. Cardiovascular: Normal rate, regular rhythm, S1 normal, S2 normal, normal heart sounds, intact distal pulses and normal pulses. No extrasystoles are present. Exam reveals no gallop. No murmur heard. Pulmonary/Chest: Effort normal and breath sounds normal. She has no wheezes. She has no rales. She exhibits no tenderness. Abdominal: Soft. Bowel sounds are normal. She exhibits no distension and no mass. There is no splenomegaly or hepatomegaly. There is no abdominal tenderness. Musculoskeletal: Normal range of motion. General: No tenderness, deformity or edema. Neurological: She is alert and oriented to person, place, and time. She has normal motor skills and normal reflexes. Gait normal.   Skin: Skin is warm and dry. Patient Active Problem List   Diagnosis    Herniated lumbar disc without myelopathy    Lumbar degenerative disc disease    Spinal stenosis, lumbar region, without neurogenic claudication    Lumbosacral spondylosis without myelopathy    Foraminal stenosis of lumbar region    Lumbar spondylosis    PAF (paroxysmal atrial fibrillation) ([address]    Diabetes mellitus type 2, uncomplicated ([address]    Paralytic ileus (HCC)    Symptomatic bradycardia    SSS (sick sinus syndrome) ([address]           No orders of the defined types were placed in this encounter. No orders of the defined types were placed in this encounter. Assessment:    1. PAF (paroxysmal atrial fibrillation) ([address]    2. Anticoagulated         Plan:     Stay on same medications. See me in 4 months. This note was partially generated using Dragon voice recognition system, and there may be some incorrect words, spellings, punctuation that were not noticed in checking the note before saving.         Electronically signed by Amy Gutiérrez MD on 11/5/2020 at 10:30 AM

## 2020-11-04 ASSESSMENT — ENCOUNTER SYMPTOMS: COLOR CHANGE: 0

## 2020-11-11 ENCOUNTER — HOSPITAL ENCOUNTER (OUTPATIENT)
Dept: CARDIOLOGY | Age: 75
Discharge: HOME | End: 2020-11-11
Payer: MEDICARE

## 2020-11-11 PROCEDURE — 93296 REM INTERROG EVL PM/IDS: CPT

## 2021-02-10 ENCOUNTER — HOSPITAL ENCOUNTER (OUTPATIENT)
Dept: CARDIOLOGY | Age: 76
Discharge: HOME | End: 2021-02-10
Payer: MEDICARE

## 2021-02-10 PROCEDURE — 93296 REM INTERROG EVL PM/IDS: CPT

## 2021-03-09 ENCOUNTER — OFFICE VISIT (OUTPATIENT)
Dept: CARDIOLOGY CLINIC | Age: 76
End: 2021-03-09
Payer: MEDICARE

## 2021-03-09 VITALS
SYSTOLIC BLOOD PRESSURE: 110 MMHG | WEIGHT: 175 LBS | HEIGHT: 66 IN | HEART RATE: 93 BPM | BODY MASS INDEX: 28.12 KG/M2 | OXYGEN SATURATION: 98 % | DIASTOLIC BLOOD PRESSURE: 60 MMHG

## 2021-03-09 DIAGNOSIS — I48.0 PAF (PAROXYSMAL ATRIAL FIBRILLATION) (HCC): Primary | ICD-10-CM

## 2021-03-09 DIAGNOSIS — Z79.01 ANTICOAGULATED: ICD-10-CM

## 2021-03-09 DIAGNOSIS — I49.5 SSS (SICK SINUS SYNDROME) (HCC): ICD-10-CM

## 2021-03-09 PROCEDURE — 1123F ACP DISCUSS/DSCN MKR DOCD: CPT | Performed by: INTERNAL MEDICINE

## 2021-03-09 PROCEDURE — G8417 CALC BMI ABV UP PARAM F/U: HCPCS | Performed by: INTERNAL MEDICINE

## 2021-03-09 PROCEDURE — 1036F TOBACCO NON-USER: CPT | Performed by: INTERNAL MEDICINE

## 2021-03-09 PROCEDURE — 99214 OFFICE O/P EST MOD 30 MIN: CPT | Performed by: INTERNAL MEDICINE

## 2021-03-09 PROCEDURE — 4040F PNEUMOC VAC/ADMIN/RCVD: CPT | Performed by: INTERNAL MEDICINE

## 2021-03-09 PROCEDURE — G8399 PT W/DXA RESULTS DOCUMENT: HCPCS | Performed by: INTERNAL MEDICINE

## 2021-03-09 PROCEDURE — G8427 DOCREV CUR MEDS BY ELIG CLIN: HCPCS | Performed by: INTERNAL MEDICINE

## 2021-03-09 PROCEDURE — 1090F PRES/ABSN URINE INCON ASSESS: CPT | Performed by: INTERNAL MEDICINE

## 2021-03-09 PROCEDURE — G8484 FLU IMMUNIZE NO ADMIN: HCPCS | Performed by: INTERNAL MEDICINE

## 2021-03-09 RX ORDER — DILTIAZEM HYDROCHLORIDE 120 MG/1
120 CAPSULE, EXTENDED RELEASE ORAL DAILY
COMMUNITY
Start: 2021-02-01 | End: 2021-03-09

## 2021-03-09 ASSESSMENT — ENCOUNTER SYMPTOMS
SHORTNESS OF BREATH: 0
CHEST TIGHTNESS: 0

## 2021-03-09 NOTE — PROGRESS NOTES
[address] [facility] ER OFFICE FOLLOW-UP      Patient: Antonietta Kinney  YOB: 1945  MRN: [id number]    Chief Complaint:  Chief Complaint   Patient presents with    Follow-up     4 month    Atrial Fibrillation    Other     SSS         Subjective/HPI:  3/9/21: Patient presents today for follow-up of pacemaker. We had seen her prior to back surgery when she presented with complete heart block. Had a temporary pacemaker followed by permanent pacemaker. She has had 3 prior back surgeries. The back pain is completely resolved somehow after the pacemaker or less pains. She used to see Dr. Amor Turner at the [facility] but has switched to us. Anticoagulated. On diltiazem 120 a day. She is also diabetic. And is on losartan for renal protection. Dyslipidemia is controlled. She follows with the pacemaker clinic. No definite chest pain or congestive heart failure symptoms. She had ultrasound of the abdominal aorta. We will follow that up during next visit. She will see me in 4 months      11/3/2020: Patient presents today for follow-up of pacemaker. She has presented to [facility] with atrial fibrillation with a slow ventricular rate and high-grade AV block. Had a temporary pacemaker followed by permanent pacemaker. She was supposed to back surgery. Has had 3 prior back surgery. Surprisingly after this hospitalization. The back pain is completely resolved. She used to see Dr. Amor Turner at the [facility] clinic but has decided to move her cardiology coverage to our practice. Doing much better. On Cardizem still gets occasional palpitation. She is on Eliquis.   She will see me in 4 months no bleeding     7/28/2020: Patient presents today for follow-up of pacemaker.  She had presented to ER with high-grade AV block and had a temporary pacemaker followed by permanent pacemaker.  She has some back issues.  Now after all this happened back is much better.  Few days ago she had sharp chest pain.  Not cardiac at all. Kellen Hong is supposed to see Richardson Eliazar at the pacemaker clinic in about a month very apprehensive.  On Eliquis for A. fib.  And Cardizem 120 a day.  She is also diabetic.  On metformin and glipizide. Kellen Hong does not want to take the Neurontin.  The pain is gone and she does not want to take it.  I told her that was between her and Dr. Ronn Draper discussion. I told her she needs to relax.  No restriction to activity.  She can lift about 15 to 20 pounds. Kellen Hong will see me in 3 months.        6/17/2020: Patient presents today for follow-up of recent admission for complete heart block.  Had a emergent temporary pacemaker followed by permanent pacemaker.  She has had 3 back surgeries by Dr. Gilbert Tsai being followed by Dr. Guy Landing has history of chronic atrial fibrillation anticoagulated and was on Cardizem 120 at night.  This was restarted after the permanent pacemaker was inserted.  When she fell down she broke some ribs and she has ecchymosis on the left side still.  She had a routine chest x-ray done by  suggested that the patient had calcified aorta.  She had abdominal aorta ultrasound and we cannot track the report.   had wanted a CAT scan of the abdomen 12 will order to be done at [facility] and she will see me in the [place] office. Wade Ly will also hook her up with the pacemaker clinic           Past Medical History:   Diagnosis Date    A-fib ([address]     Arthritis     Chronic back pain     Chronic back pain     Diabetes ([address]     Hx x 31 yrs    Hyperlipidemia     meds < 10 yrs    Hypertension     meds < 10 yrs    Osteoarthritis     spine       Past Surgical History:   Procedure Laterality Date    BACK SURGERY      x 3 ORs lumbar spine--last 2015    CARPAL TUNNEL RELEASE Bilateral 2000s    [address] [facility] with [facility] [address]      as child       Family History   Problem Relation Age of Onset    Arthritis Mother     High Blood Pressure Father     Heart Disease Father     Heart Failure Father     No Known Problems Sister     Heart Disease Son         cardiac stents    Diabetes Son     Diabetes Daughter         neuropathy    Thyroid Disease Daughter     Diabetes Sister     Kidney Disease Sister        Social History     Socioeconomic History    Marital status:      Spouse name: None    Number of children: None    Years of education: None    Highest education level: None   Occupational History    None   Social Needs    Financial resource strain: None    Food insecurity     Worry: None     Inability: None    Transportation needs     Medical: None     Non-medical: None   Tobacco Use    Smoking status: Never Smoker    Smokeless tobacco: Never Used   Substance and Sexual Activity    Alcohol use:  Yes     Alcohol/week: 0.0 standard drinks     Comment: social rare    Drug use: No    Sexual activity: None   Lifestyle    Physical activity     Days per week: None     Minutes per session: None    Stress: None   Relationships    Social connections     Talks on phone: None     Gets together: None     Attends [employer] service: None     Active member of club or organization: None     Attends meetings of clubs or organizations: None     Relationship status: None    Intimate partner violence     Fear of current or ex partner: None     Emotionally abused: None     Physically abused: None     Forced sexual activity: None   Other Topics Concern    None   Social History Narrative    None       Allergies   Allergen Reactions    Other      Honeydew melon--throat closes & itching      Dilaudid [Hydromorphone Hcl] Rash       Current Outpatient Medications   Medication Sig Dispense Refill    ELIQUIS 5 MG TABS tablet Take 1 tablet by mouth 2 times daily OK to resume on Thursday 6/11/20 60 tablet 3    Cholecalciferol 50 MCG (2000 UT) CAPS Take 1 capsule by mouth daily      Coenzyme Q10 (COQ-10 PO) Take 200 mg by mouth daily      LATANOPROST OP Apply 1 drop to eye every evening Indications: OU       acetaminophen (TYLENOL) 325 MG tablet Take 650 mg by mouth every 6 hours as needed for Pain      docusate sodium (COLACE) 100 MG capsule Take 100 mg by mouth 2 times daily      diltiazem (DILTIAZEM CD) 120 MG extended release capsule Take 120 mg by mouth nightly       metFORMIN (GLUCOPHAGE) 1000 MG tablet Take 1,000 mg by mouth 2 times daily (with meals)      Biotin 1 MG CAPS Take 5,000 mcg by mouth daily       losartan (COZAAR) 25 MG tablet Take 25 mg by mouth nightly       rosuvastatin (CRESTOR) 10 MG tablet Take 20 mg by mouth nightly       glipiZIDE (GLUCOTROL XL) 2.5 MG extended release tablet Take 2.5 mg by mouth daily      magnesium (MAGNESIUM-OXIDE) 250 MG TABS tablet Take 250 mg by mouth daily       No current facility-administered medications for this visit. Review of Systems:   Review of Systems   Constitutional: Negative for activity change, appetite change, diaphoresis, fatigue and unexpected weight change. HENT: Negative for facial swelling, nosebleeds, trouble swallowing and voice change. Respiratory: Negative for apnea, chest tightness, shortness of breath and wheezing. Cardiovascular: Negative for chest pain, palpitations and leg swelling. Gastrointestinal: Negative for abdominal distention, anal bleeding, blood in stool, nausea and vomiting. Genitourinary: Negative for decreased urine volume and dysuria. Musculoskeletal: Positive for arthralgias. Negative for gait problem and myalgias. Sharp pain in right foot   Skin: Negative for color change, pallor, rash and wound. Neurological: Negative for dizziness, syncope, facial asymmetry, weakness, light-headedness, numbness and headaches. Hematological: Does not bruise/bleed easily. Psychiatric/Behavioral: Negative for agitation, behavioral problems, confusion, hallucinations and suicidal ideas.  The patient is not nervous/anxious. All other systems reviewed and are negative. Review of System is negative except for as mentioned above. Physical Examination:    /60 (Site: Left Upper Arm, Position: Sitting, Cuff Size: Large Adult)   Pulse 93   Ht 5' 6\" (1.676 m)   Wt 175 lb (79.4 kg)   SpO2 98%   BMI 28.25 kg/m²    Physical Exam   Constitutional: She appears healthy. No distress. HENT:   Nose: Nose normal.   Mouth/Throat: Dentition is normal. Oropharynx is clear. Eyes: Pupils are equal, round, and reactive to light. Conjunctivae are normal.   Neck: Normal range of motion and thyroid normal. Neck supple. Cardiovascular: Regular rhythm, S1 normal, S2 normal, normal heart sounds, intact distal pulses and normal pulses. PMI is not displaced. No murmur heard. Pulmonary/Chest: She has no wheezes. She has no rales. She exhibits no tenderness. Abdominal: Soft. Bowel sounds are normal. She exhibits no distension and no mass. There is no splenomegaly or hepatomegaly. There is no abdominal tenderness. No hernia. Neurological: She is alert and oriented to person, place, and time. She has normal motor skills. Gait normal.   Skin: Skin is warm and dry. No cyanosis. No jaundice. Nails show no clubbing. Patient Active Problem List   Diagnosis    Herniated lumbar disc without myelopathy    Lumbar degenerative disc disease    Spinal stenosis, lumbar region, without neurogenic claudication    Lumbosacral spondylosis without myelopathy    Foraminal stenosis of lumbar region    Lumbar spondylosis    PAF (paroxysmal atrial fibrillation) ([address]    Diabetes mellitus type 2, uncomplicated ([address]    Paralytic ileus (HCC)    Symptomatic bradycardia    SSS (sick sinus syndrome) ([address]           No orders of the defined types were placed in this encounter. No orders of the defined types were placed in this encounter. Assessment/Orders:       ICD-10-CM    1.  PAF (paroxysmal atrial fibrillation) ([facility])  I48.0    2. Anticoagulated  Z79.01    3. SSS (sick sinus syndrome) ([facility])  I49.5        No orders of the defined types were placed in this encounter. Medications Discontinued During This Encounter   Medication Reason    dilTIAZem (TIAZAC) 120 MG extended release capsule LIST CLEANUP    gabapentin (NEURONTIN) 100 MG capsule LIST CLEANUP       No orders of the defined types were placed in this encounter. Plan:    Stay on same medications.     See me in 4 months        Electronically signed by: Chio Funes MD  3/11/2021 8:08 AM

## 2021-03-10 ASSESSMENT — ENCOUNTER SYMPTOMS
ANAL BLEEDING: 0
BLOOD IN STOOL: 0
VOMITING: 0
NAUSEA: 0
APNEA: 0
FACIAL SWELLING: 0
TROUBLE SWALLOWING: 0
ABDOMINAL DISTENTION: 0
WHEEZING: 0
VOICE CHANGE: 0
COLOR CHANGE: 0

## 2021-05-12 ENCOUNTER — HOSPITAL ENCOUNTER (OUTPATIENT)
Dept: CARDIOLOGY | Age: 76
Discharge: HOME | End: 2021-05-12
Payer: MEDICARE

## 2021-05-12 PROCEDURE — 93280 PM DEVICE PROGR EVAL DUAL: CPT

## 2021-07-13 ENCOUNTER — OFFICE VISIT (OUTPATIENT)
Dept: CARDIOLOGY CLINIC | Age: 76
End: 2021-07-13
Payer: MEDICARE

## 2021-07-13 VITALS
SYSTOLIC BLOOD PRESSURE: 128 MMHG | WEIGHT: 182 LBS | BODY MASS INDEX: 29.25 KG/M2 | OXYGEN SATURATION: 97 % | DIASTOLIC BLOOD PRESSURE: 78 MMHG | HEART RATE: 95 BPM | HEIGHT: 66 IN

## 2021-07-13 DIAGNOSIS — I48.0 PAF (PAROXYSMAL ATRIAL FIBRILLATION) (HCC): Primary | ICD-10-CM

## 2021-07-13 DIAGNOSIS — M51.36 LUMBAR DEGENERATIVE DISC DISEASE: ICD-10-CM

## 2021-07-13 PROBLEM — I44.7 LBBB (LEFT BUNDLE BRANCH BLOCK): Status: ACTIVE | Noted: 2019-12-04

## 2021-07-13 PROBLEM — E78.5 HYPERLIPIDEMIA: Status: ACTIVE | Noted: 2017-05-12

## 2021-07-13 PROBLEM — M54.10 RADICULAR SYNDROME OF RIGHT LEG: Status: ACTIVE | Noted: 2017-05-12

## 2021-07-13 PROBLEM — I44.2 COMPLETE ATRIOVENTRICULAR BLOCK (HCC): Status: ACTIVE | Noted: 2020-06-16

## 2021-07-13 PROCEDURE — 93000 ELECTROCARDIOGRAM COMPLETE: CPT | Performed by: INTERNAL MEDICINE

## 2021-07-13 PROCEDURE — G8427 DOCREV CUR MEDS BY ELIG CLIN: HCPCS | Performed by: INTERNAL MEDICINE

## 2021-07-13 PROCEDURE — G8417 CALC BMI ABV UP PARAM F/U: HCPCS | Performed by: INTERNAL MEDICINE

## 2021-07-13 PROCEDURE — 1036F TOBACCO NON-USER: CPT | Performed by: INTERNAL MEDICINE

## 2021-07-13 PROCEDURE — 4040F PNEUMOC VAC/ADMIN/RCVD: CPT | Performed by: INTERNAL MEDICINE

## 2021-07-13 PROCEDURE — G8399 PT W/DXA RESULTS DOCUMENT: HCPCS | Performed by: INTERNAL MEDICINE

## 2021-07-13 PROCEDURE — 1123F ACP DISCUSS/DSCN MKR DOCD: CPT | Performed by: INTERNAL MEDICINE

## 2021-07-13 PROCEDURE — 99214 OFFICE O/P EST MOD 30 MIN: CPT | Performed by: INTERNAL MEDICINE

## 2021-07-13 PROCEDURE — 1090F PRES/ABSN URINE INCON ASSESS: CPT | Performed by: INTERNAL MEDICINE

## 2021-07-13 RX ORDER — LATANOPROST 50 UG/ML
SOLUTION/ DROPS OPHTHALMIC
COMMUNITY
Start: 2021-06-09

## 2021-07-13 RX ORDER — TRAMADOL HYDROCHLORIDE 50 MG/1
50 TABLET ORAL EVERY 4 HOURS PRN
Qty: 30 TABLET | Refills: 0 | Status: SHIPPED | OUTPATIENT
Start: 2021-07-13 | End: 2021-07-18

## 2021-07-13 RX ORDER — TRAMADOL HYDROCHLORIDE 50 MG/1
50 TABLET ORAL EVERY 4 HOURS PRN
Qty: 30 TABLET | Refills: 0 | Status: SHIPPED | OUTPATIENT
Start: 2021-07-13 | End: 2021-07-13

## 2021-07-13 RX ORDER — BACLOFEN 20 MG
TABLET ORAL
COMMUNITY

## 2021-07-13 RX ORDER — PRAVASTATIN SODIUM 20 MG
20 TABLET ORAL DAILY
Qty: 90 TABLET | Refills: 3 | Status: SHIPPED | OUTPATIENT
Start: 2021-07-13 | End: 2022-03-18

## 2021-07-13 ASSESSMENT — ENCOUNTER SYMPTOMS
VOMITING: 0
DIARRHEA: 0
STRIDOR: 0
COLOR CHANGE: 0
BLOOD IN STOOL: 0
CHEST TIGHTNESS: 0
NAUSEA: 0
COUGH: 0
APNEA: 0
WHEEZING: 0
SHORTNESS OF BREATH: 0

## 2021-07-13 NOTE — PROGRESS NOTES
[facility] CARDIOLOGY OFFICE FOLLOW-UP      Patient: Kristin Sauceda  YOB: 1945  MRN: [id number]    Chief Complaint:  Chief Complaint   Patient presents with    Follow-up    Atrial Fibrillation         Subjective/HPI:  7/13/21: Patient presents today for follow-up of pacemaker. Occasionally she will have rapid atrial fibrillation. Had a pacemaker check recently. Now her back is acting up. Significant pain. It has resolved for many months. She is on Eliquis. No bleeding. She used to see Dr. Gaby Feldman at [facility] and decided to follow with us. Has gained some weight. Laboratory noted. We will give her Ultram for short-term and only as needed use. See me in 4 month      3/9/21: Patient presents today for follow-up of pacemaker. We had seen her prior to back surgery when she presented with complete heart block. Had a temporary pacemaker followed by permanent pacemaker. She has had 3 prior back surgeries. The back pain is completely resolved somehow after the pacemaker or less pains. She used to see Dr. Gaby Feldman at the [facility] [employer] clinic but has switched to us. Anticoagulated. On diltiazem 120 a day. She is also diabetic. And is on losartan for renal protection. Dyslipidemia is controlled. She follows with the pacemaker clinic. No definite chest pain or congestive heart failure symptoms. She had ultrasound of the abdominal aorta. We will follow that up during next visit.   She will see me in 4 months        11/3/2020: Patient presents today for follow-up of pacemaker. Christo Cortez has presented to [facility] with atrial fibrillation with a slow ventricular rate and high-grade AV block.  Had a temporary pacemaker followed by permanent pacemaker.  She was supposed to back surgery.  Has had 3 prior back surgery.  Surprisingly after this hospitalization.  The back pain is completely resolved.  She used to see Dr. Gaby Feldman at the [facility] [employer] clinic but has decided to move her cardiology coverage to our practice. Freda Page much better. Frank  Cardizem still gets occasional palpitation. Mirella Nolan is on Eliquis.  She will see me in 4 months no bleeding     7/28/2020: Patient presents today for follow-up of pacemaker.  She had presented to ER with high-grade AV block and had a temporary pacemaker followed by permanent pacemaker.  She has some back issues.  Now after all this happened back is much better.  Few days ago she had sharp chest pain.  Not cardiac at all. Mirella Nolan is supposed to see Racquel Susie at the pacemaker clinic in about a month very apprehensive.  On Eliquis for A. fib.  And Cardizem 120 a day.  She is also diabetic.  On metformin and glipizide. Mirella Nolan does not want to take the Neurontin.  The pain is gone and she does not want to take it.  I told her that was between her and Dr. Schneider Leisure discussion. I told her she needs to relax.  No restriction to activity.  She can lift about 15 to 20 pounds. Mirella Nolan will see me in 3 months.        6/17/2020: Patient presents today for follow-up of recent admission for complete heart block.  Had a emergent temporary pacemaker followed by permanent pacemaker.  She has had 3 back surgeries by Dr. Cristy Snider being followed by Dr. Erwin Euceda has history of chronic atrial fibrillation anticoagulated and was on Cardizem 120 at night.  This was restarted after the permanent pacemaker was inserted.  When she fell down she broke some ribs and she has ecchymosis on the left side still.  She had a routine chest x-ray done by  suggested that the patient had calcified aorta.  She had abdominal aorta ultrasound and we cannot track the report.   had wanted a CAT scan of the abdomen 12 will order to be done at [facility] and she will see me in the [place] office. Delores Reynaldo will also hook her up with the pacemaker clinic                 Past Medical History:   Diagnosis Date    A-fib ([facility]     Arthritis     Chronic back pain     Chronic back pain     Diabetes ([facility]     Hx x 31 yrs    Hyperlipidemia     meds < 10 yrs    Hypertension     meds < 10 yrs    Osteoarthritis     spine       Past Surgical History:   Procedure Laterality Date    BACK SURGERY      x 3 ORs lumbar spine--last     CARPAL TUNNEL RELEASE Bilateral 2000s     SECTION   &     EYE SURGERY      [place] with IOL OU    HYSTERECTOMY  1987    SPINE SURGERY      TONSILLECTOMY      as child       Family History   Problem Relation Age of Onset    Arthritis Mother     High Blood Pressure Father     Heart Disease Father     Heart Failure Father     No Known Problems Sister     Heart Disease Son         cardiac stents    Diabetes Son     Diabetes Daughter         neuropathy    Thyroid Disease Daughter     Diabetes Sister     Kidney Disease Sister        Social History     Socioeconomic History    Marital status:      Spouse name: Not on file    Number of children: Not on file    Years of education: Not on file    Highest education level: Not on file   Occupational History    Not on file   Tobacco Use    Smoking status: Never Smoker    Smokeless tobacco: Never Used   Vaping Use    Vaping Use: Never used   Substance and Sexual Activity    Alcohol use: Yes     Alcohol/week: 0.0 standard drinks     Comment: social rare    Drug use: No    Sexual activity: Not on file   Other Topics Concern    Not on file   Social History Narrative    Not on file     Social Determinants of Health     Financial Resource Strain:     Difficulty of Paying Living Expenses:    Food Insecurity:     Worried About Running Out of Food in the Last Year:     [address] in the Last Year:    Transportation Needs:     Lack of Transportation (Medical):      Lack of Transportation (Non-Medical):    Physical Activity:     Days of Exercise per Week:     Minutes of Exercise per Session:    Stress:     Feeling of Stress :    Social Connections:     Frequency of Communication with Friends and Family:     Frequency of Social Gatherings with Friends and Family:     Attends Buddhism Services:     Active Member of Clubs or Organizations:     Attends Club or Organization Meetings:     Marital Status:    Intimate Partner Violence:     Fear of Current or Ex-Partner:     Emotionally Abused:     Physically Abused:     Sexually Abused: Allergies   Allergen Reactions    Other      Honeydew melon--throat closes & itching      Dilaudid [Hydromorphone Hcl] Rash       Current Outpatient Medications   Medication Sig Dispense Refill    latanoprost (XALATAN) 0.005 % ophthalmic solution instill 1 drop into both eyes at bedtime as directed      cyanocobalamin 1000 MCG tablet Take 1,000 mcg by mouth daily      Magnesium Oxide 500 MG TABS Take by mouth      pravastatin (PRAVACHOL) 20 MG tablet Take 1 tablet by mouth daily 90 tablet 3    traMADol (ULTRAM) 50 MG tablet Take 1 tablet by mouth every 4 hours as needed for Pain for up to 5 days. Intended supply: 5 days. Take lowest dose possible to manage pain 30 tablet 0    traMADol (ULTRAM) 50 MG tablet Take 1 tablet by mouth every 4 hours as needed for Pain for up to 5 days. Intended supply: 5 days.  Take lowest dose possible to manage pain 30 tablet 0    ELIQUIS 5 MG TABS tablet Take 1 tablet by mouth 2 times daily OK to resume on Thursday 6/11/20 60 tablet 3    Cholecalciferol 50 MCG (2000 UT) CAPS Take 1 capsule by mouth daily      Coenzyme Q10 (COQ-10 PO) Take 200 mg by mouth daily      acetaminophen (TYLENOL) 325 MG tablet Take 650 mg by mouth every 6 hours as needed for Pain      docusate sodium (COLACE) 100 MG capsule Take 100 mg by mouth 2 times daily      diltiazem (DILTIAZEM CD) 120 MG extended release capsule Take 120 mg by mouth nightly       metFORMIN (GLUCOPHAGE) 1000 MG tablet Take 1,000 mg by mouth 2 times daily (with meals)      Biotin 1 MG CAPS Take 5,000 mcg by mouth daily       losartan (COZAAR) 25 MG tablet Take 25 mg by mouth nightly       rosuvastatin (CRESTOR) 10 MG tablet Take 20 mg by mouth nightly       glipiZIDE (GLUCOTROL XL) 2.5 MG extended release tablet Take 2.5 mg by mouth daily      magnesium (MAGNESIUM-OXIDE) 250 MG TABS tablet Take 250 mg by mouth daily       No current facility-administered medications for this visit. Review of Systems:   Review of Systems   Constitutional: Negative for appetite change, diaphoresis and fatigue. HENT: Negative for nosebleeds. Respiratory: Negative for apnea, cough, chest tightness, shortness of breath, wheezing and stridor. Cardiovascular: Positive for chest pain and leg swelling. Negative for palpitations. Gastrointestinal: Negative for blood in stool, diarrhea, nausea and vomiting. Musculoskeletal: Negative for myalgias. Skin: Negative for color change, pallor, rash and wound. Neurological: Positive for dizziness and light-headedness. Negative for seizures, syncope, weakness, numbness and headaches. Hematological: Does not bruise/bleed easily. Psychiatric/Behavioral: Negative for agitation, behavioral problems, confusion and suicidal ideas. The patient is not nervous/anxious and is not hyperactive. All other systems reviewed and are negative. Review of System is negative except for as mentioned above. Physical Examination:    /78 (Site: Left Upper Arm, Position: Sitting, Cuff Size: Medium Adult)   Pulse 95   Ht 5' 6\" (1.676 m)   Wt 182 lb (82.6 kg)   SpO2 97%   BMI 29.38 kg/m²    Physical Exam   Constitutional: She appears healthy. HENT:   Nose: Nose normal.   Mouth/Throat: Dentition is normal. Oropharynx is clear. Eyes: Pupils are equal, round, and reactive to light. Cardiovascular: Normal rate, regular rhythm, S1 normal, S2 normal, normal heart sounds, intact distal pulses and normal pulses. No extrasystoles are present. Exam reveals no gallop. No murmur heard. Pulmonary/Chest: Effort normal and breath sounds normal. She has no wheezes. She has no rales. She exhibits no tenderness. Abdominal: Soft. Bowel sounds are normal. She exhibits no distension and no mass. There is no splenomegaly or hepatomegaly. There is no abdominal tenderness. Musculoskeletal:         General: No tenderness, deformity or edema. Normal range of motion. Cervical back: Normal range of motion. Neurological: She is alert and oriented to person, place, and time. She has normal motor skills and normal reflexes. Gait normal.   Skin: Skin is warm and dry. Patient Active Problem List   Diagnosis    Herniated lumbar disc without myelopathy    Lumbar degenerative disc disease    Spinal stenosis, lumbar region, without neurogenic claudication    Lumbosacral spondylosis without myelopathy    Foraminal stenosis of lumbar region    Lumbar spondylosis    PAF ([facility]oxysmal [facility] fibrillation) ([facility]    Diabetes mellitus type 2, uncomplicated ([facility])    Paralytic ileus (HCC)    Symptomatic bradycardia    SSS (sick sinus syndrome) ([facility])    Complete atrioventricular block (HCC)    Carpal tunnel syndrome    Essential hypertension    Hyperlipidemia    LBBB (left bundle branch block)    Primary localized osteoarthrosis, hand    Radicular syndrome of right leg           Orders Placed This Encounter   Procedures    EKG 12 lead     Order Specific Question:   Reason for Exam?     Answer:   Irregular heart rate         Orders Placed This Encounter   Medications    pravastatin (PRAVACHOL) 20 MG tablet     Sig: Take 1 tablet by mouth daily     Dispense:  90 tablet     Refill:  3    DISCONTD: traMADol (ULTRAM) 50 MG tablet     Sig: Take 1 tablet by mouth every 4 hours as needed for Pain for up to 5 days. Intended supply: 5 days.  Take lowest dose possible to manage pain     Dispense:  30 tablet     Refill:  0     Reduce doses taken as pain becomes manageable SEE PRIMARY CARE FOR FURTHER REFILLS    traMADol (ULTRAM) 50 MG tablet     Sig: Take 1 tablet by mouth every 4 hours as needed for Pain for up to 5 days. Intended supply: 5 days. Take lowest dose possible to manage pain     Dispense:  30 tablet     Refill:  0     Reduce doses taken as pain becomes manageable    traMADol (ULTRAM) 50 MG tablet     Sig: Take 1 tablet by mouth every 4 hours as needed for Pain for up to 5 days. Intended supply: 5 days. Take lowest dose possible to manage pain     Dispense:  30 tablet     Refill:  0     Reduce doses taken as pain becomes manageable             Assessment/Orders:       ICD-10-CM    1. PAF (paroxysmal atrial fibrillation) ([facility])  I48.0 EKG 12 lead   2. Lumbar degenerative disc disease  M51.36 traMADol (ULTRAM) 50 MG tablet     traMADol (ULTRAM) 50 MG tablet     DISCONTINUED: traMADol (ULTRAM) 50 MG tablet       Orders Placed This Encounter   Medications    pravastatin (PRAVACHOL) 20 MG tablet     Sig: Take 1 tablet by mouth daily     Dispense:  90 tablet     Refill:  3    DISCONTD: traMADol (ULTRAM) 50 MG tablet     Sig: Take 1 tablet by mouth every 4 hours as needed for Pain for up to 5 days. Intended supply: 5 days. Take lowest dose possible to manage pain     Dispense:  30 tablet     Refill:  0     Reduce doses taken as pain becomes manageable SEE PRIMARY CARE FOR FURTHER REFILLS    traMADol (ULTRAM) 50 MG tablet     Sig: Take 1 tablet by mouth every 4 hours as needed for Pain for up to 5 days. Intended supply: 5 days. Take lowest dose possible to manage pain     Dispense:  30 tablet     Refill:  0     Reduce doses taken as pain becomes manageable    traMADol (ULTRAM) 50 MG tablet     Sig: Take 1 tablet by mouth every 4 hours as needed for Pain for up to 5 days. Intended supply: 5 days.  Take lowest dose possible to manage pain     Dispense:  30 tablet     Refill:  0     Reduce doses taken as pain becomes manageable       Medications Discontinued During This Encounter   Medication Reason    LATANOPROST OP LIST CLEANUP    traMADol (ULTRAM) 50 MG tablet        Orders Placed This Encounter   Procedures    EKG 12 lead     Order Specific Question:   Reason for Exam?     Answer:   Irregular heart rate         Plan:  Prescribed Tramadol 50mg Three Times a Day as needed    Prescribed Pravastatin 20mg Daily    Stay on same medications.     See me in 4 months         Electronically signed by: Joao Abad MD  7/13/2021 12:32 PM

## 2021-08-10 ENCOUNTER — HOSPITAL ENCOUNTER (OUTPATIENT)
Dept: CARDIOLOGY | Age: 76
Discharge: HOME | End: 2021-08-10
Payer: MEDICARE

## 2021-08-10 PROCEDURE — 93296 REM INTERROG EVL PM/IDS: CPT

## 2021-09-09 ENCOUNTER — VIRTUAL VISIT (OUTPATIENT)
Dept: CARDIOLOGY CLINIC | Age: 76
End: 2021-09-09
Payer: MEDICARE

## 2021-09-09 DIAGNOSIS — Z79.01 ANTICOAGULATED: ICD-10-CM

## 2021-09-09 DIAGNOSIS — I48.91 ATRIAL FIBRILLATION, UNSPECIFIED TYPE (HCC): ICD-10-CM

## 2021-09-09 DIAGNOSIS — R00.1 SYMPTOMATIC BRADYCARDIA: ICD-10-CM

## 2021-09-09 DIAGNOSIS — I49.5 SSS (SICK SINUS SYNDROME) (HCC): ICD-10-CM

## 2021-09-09 DIAGNOSIS — I44.2 COMPLETE ATRIOVENTRICULAR BLOCK (HCC): ICD-10-CM

## 2021-09-09 PROCEDURE — 99443 PR PHYS/QHP TELEPHONE EVALUATION 21-30 MIN: CPT | Performed by: INTERNAL MEDICINE

## 2021-09-09 ASSESSMENT — ENCOUNTER SYMPTOMS
VOMITING: 0
BLOOD IN STOOL: 0
NAUSEA: 0

## 2021-09-09 NOTE — PROGRESS NOTES
2021    TELEHEALTH EVALUATION -- Audio/Visual (During QPF-49 public health emergency)    HPI:    Myrna Almodovar (:  1945) has requested an audio/video evaluation for the following concern(s): Patient requested a tele visit. Very pleasant lady with a history of atrial fibrillation on anticoagulation and Cardizem. For 2 weeks she has been complaining of fever chills fatigue and recently loss of taste. She went for testing at the [facility] andtested positive for covid. Since the symptoms started 2 weeks ago she was told that she may be toward the end of the virus. She did receive the Covid vaccine in February or March. She has good days and bad days. Occasionally she will have heart palpitations. The fastest heart rate was 110. Some shortness of breath no ankle edema. Review of Systems   Constitutional: Positive for appetite change, chills, fatigue and fever. Negative for diaphoresis. HENT: Negative for nosebleeds. Cardiovascular: Negative for chest pain, palpitations and leg swelling. Gastrointestinal: Negative for blood in stool, nausea and vomiting. Musculoskeletal: Negative for myalgias. Neurological: Negative for dizziness, seizures, weakness and headaches. Psychiatric/Behavioral: The patient is not nervous/anxious. Prior to Visit Medications    Medication Sig Taking?  Authorizing Provider   latanoprost (XALATAN) 0.005 % ophthalmic solution instill 1 drop into both eyes at bedtime as directed Yes Historical Provider, MD   cyanocobalamin 1000 MCG tablet Take 1,000 mcg by mouth daily Yes Historical Provider, MD   Magnesium Oxide 500 MG TABS Take by mouth Yes Historical Provider, MD   pravastatin (PRAVACHOL) 20 MG tablet Take 1 tablet by mouth daily Yes Dania Venegas MD   ELIQUIS 5 MG TABS tablet Take 1 tablet by mouth 2 times daily OK to resume on 20 Yes DANIELA Corona   Cholecalciferol 50 MCG ( UT) CAPS Take 1 capsule by mouth daily Yes Historical Provider, MD   Coenzyme Q10 (COQ-10 PO) Take 200 mg by mouth daily Yes Historical Provider, MD   acetaminophen (TYLENOL) 325 MG tablet Take 650 mg by mouth every 6 hours as needed for Pain Yes Historical Provider, MD   docusate sodium (COLACE) 100 MG capsule Take 100 mg by mouth 2 times daily Yes Historical Provider, MD   diltiazem (DILTIAZEM CD) 120 MG extended release capsule Take 120 mg by mouth nightly  Yes Historical Provider, MD   metFORMIN (GLUCOPHAGE) 1000 MG tablet Take 1,000 mg by mouth 2 times daily (with meals) Yes Historical Provider, MD   Biotin 1 MG CAPS Take 5,000 mcg by mouth daily  Yes Historical Provider, MD   losartan (COZAAR) 25 MG tablet Take 25 mg by mouth nightly  Yes Historical Provider, MD   rosuvastatin (CRESTOR) 10 MG tablet Take 20 mg by mouth nightly  Yes Historical Provider, MD   glipiZIDE (GLUCOTROL XL) 2.5 MG extended release tablet Take 2.5 mg by mouth daily Yes Historical Provider, MD   magnesium (MAGNESIUM-OXIDE) 250 MG TABS tablet Take 250 mg by mouth daily Yes Historical Provider, MD       Social History     Tobacco Use    Smoking status: Never Smoker    Smokeless tobacco: Never Used   Vaping Use    Vaping Use: Never used   Substance Use Topics    Alcohol use:  Yes     Alcohol/week: 0.0 standard drinks     Comment: social rare    Drug use: No        Allergies   Allergen Reactions    Other      Honeydew melon--throat closes & itching      Dilaudid [Hydromorphone Hcl] Rash   ,   Past Medical History:   Diagnosis Date    A-fib (HCC)     Arthritis     Chronic back pain     Chronic back pain     Diabetes ([facility] [address]     Hx x 31 yrs    Hyperlipidemia     meds < 10 yrs    Hypertension     meds < 10 yrs    Osteoarthritis     spine   ,   Past Surgical History:   Procedure Laterality Date    BACK SURGERY      x 3 ORs lumbar spine--last     CARPAL TUNNEL RELEASE Bilateral 2000s     SECTION   &     EYE SURGERY      [place] with IOL OU    HYSTERECTOMY  1987    SPINE SURGERY      TONSILLECTOMY      as child   ,   Social History     Tobacco Use    Smoking status: Never Smoker    Smokeless tobacco: Never Used   Vaping Use    Vaping Use: Never used   Substance Use Topics    Alcohol use:  Yes     Alcohol/week: 0.0 standard drinks     Comment: social rare    Drug use: No   ,   Family History   Problem Relation Age of Onset    Arthritis Mother     High Blood Pressure Father     Heart Disease Father     Heart Failure Father     No Known Problems Sister     Heart Disease Son         cardiac stents    Diabetes Son     Diabetes Daughter         neuropathy    Thyroid Disease Daughter     Diabetes Sister     Kidney Disease Sister    ,   Immunization History   Administered Date(s) Administered    COVID-19, Vázquez Peter, PF, 30mcg/0.3mL 02/02/2021, 02/23/2021    Influenza Virus Vaccine 10/13/2012, 10/11/2013, 12/18/2014    Influenza, High Dose (Fluzone 65 yrs and older) 11/14/2015, 10/29/2016, 10/07/2017, 01/31/2019, 09/24/2019    Influenza, Quadv, adjuvanted, 65 yrs +, IM, PF (Fluad) 10/04/2020    Influenza, Triv, inactivated, subunit, adjuvanted, IM (Fluad 65 yrs and older) 09/24/2019    Pneumococcal Conjugate 13-valent ([id number]) 11/14/2015    Tdap (Boostrix, Adacel) 09/26/2012    Zoster Recombinant (Shingrix) 08/16/2018, 10/16/2018   ,   Health Maintenance   Topic Date Due    Hepatitis C screen  Never done    Pneumococcal 65+ years Vaccine (2 of 2 - PPSV23) 11/14/2016    Annual Wellness Visit (AWV)  Never done    Potassium monitoring  06/06/2021    Creatinine monitoring  06/06/2021    Lipid screen  06/08/2021    Flu vaccine (1) 09/01/2021    DTaP/Tdap/Td vaccine (2 - Td or Tdap) 09/26/2022    DEXA (modify frequency per FRAX score)  Completed    Shingles Vaccine  Completed    COVID-19 Vaccine  Completed    Hepatitis A vaccine  Aged Out    Hib vaccine  Aged Out    Meningococcal (ACWY) vaccine  Aged Out ASSESSMENT/PLAN:  Atrial fibrillation  Anticoagulated  Covid positive  SOB  Plan: If she has significant palpitations she can take an extra Cardizem, 120 Mg  Watch fluid intake  Follow Covid guidelines  She should rest for the next 3 to 4 days until she feels better. No follow-ups on file. Ward Fontaine, was evaluated through a synchronous (real-time) audio-video encounter. The patient (or guardian if applicable) is aware that this is a billable service. Verbal consent to proceed has been obtained within the past 12 months. The visit was conducted pursuant to the emergency declaration under the [address] [place] [address], [address] [place] [address] authority and the [employer] and [employer] General Act. Patient identification was verified, and a caregiver was present when appropriate. The patient was located in a state where the provider was credentialed to provide care. Total time spent on this encounter: 21-30 mins    --Omar Camp MD on 9/9/2021 at 3:43 PM    An electronic signature was used to authenticate this note.

## 2021-11-02 ENCOUNTER — OFFICE VISIT (OUTPATIENT)
Dept: CARDIOLOGY CLINIC | Age: 76
End: 2021-11-02
Payer: MEDICARE

## 2021-11-02 VITALS
BODY MASS INDEX: 28.93 KG/M2 | HEART RATE: 85 BPM | HEIGHT: 66 IN | SYSTOLIC BLOOD PRESSURE: 126 MMHG | WEIGHT: 180 LBS | OXYGEN SATURATION: 98 % | DIASTOLIC BLOOD PRESSURE: 60 MMHG

## 2021-11-02 DIAGNOSIS — I48.0 PAF (PAROXYSMAL ATRIAL FIBRILLATION) (HCC): Primary | ICD-10-CM

## 2021-11-02 PROCEDURE — 1123F ACP DISCUSS/DSCN MKR DOCD: CPT | Performed by: INTERNAL MEDICINE

## 2021-11-02 PROCEDURE — G8484 FLU IMMUNIZE NO ADMIN: HCPCS | Performed by: INTERNAL MEDICINE

## 2021-11-02 PROCEDURE — 1090F PRES/ABSN URINE INCON ASSESS: CPT | Performed by: INTERNAL MEDICINE

## 2021-11-02 PROCEDURE — 99214 OFFICE O/P EST MOD 30 MIN: CPT | Performed by: INTERNAL MEDICINE

## 2021-11-02 PROCEDURE — 4040F PNEUMOC VAC/ADMIN/RCVD: CPT | Performed by: INTERNAL MEDICINE

## 2021-11-02 PROCEDURE — G8399 PT W/DXA RESULTS DOCUMENT: HCPCS | Performed by: INTERNAL MEDICINE

## 2021-11-02 PROCEDURE — G8417 CALC BMI ABV UP PARAM F/U: HCPCS | Performed by: INTERNAL MEDICINE

## 2021-11-02 PROCEDURE — 1036F TOBACCO NON-USER: CPT | Performed by: INTERNAL MEDICINE

## 2021-11-02 PROCEDURE — G8427 DOCREV CUR MEDS BY ELIG CLIN: HCPCS | Performed by: INTERNAL MEDICINE

## 2021-11-02 RX ORDER — TRAMADOL HYDROCHLORIDE 50 MG/1
50 TABLET ORAL EVERY 6 HOURS PRN
COMMUNITY

## 2021-11-02 ASSESSMENT — ENCOUNTER SYMPTOMS
CHEST TIGHTNESS: 0
SHORTNESS OF BREATH: 0

## 2021-11-02 NOTE — PROGRESS NOTES
[facility] CARDIOLOGY OFFICE FOLLOW-UP      Patient: Darcie Shea  YOB: 1945  MRN: [id number]    Chief Complaint:  Chief Complaint   Patient presents with    Follow-up    Atrial Fibrillation         Subjective/HPI:  11/02/21: Patient presents today for for follow-up of atrial fibrillation. Had a pacemaker check recently. Her chronic back problems has turned. She is physically very active. Was supposed to have's procedure by .  In the process of getting ready for the procedure, she had complete heart block had a pacemaker done and for some reason the back pain resolved. She did not pursue surgery then. Now it has come back. She was asking who she should go to, I have suggested to her to see  at [facility]. Continue with the Eliquis. She still gets some palpitations and I think recurrent A. fib. She is on diltiazem 120 mg daily. She is diabetic hypertensive, has dyslipidemia. She used to go to  at [facility], now follows with us. She will see me in 4 months. If there is something doctor can operate, they would have to hold the Eliquis 3 days before the procedure. see me in 4      7/13/21: Patient presents today for follow-up of pacemaker. Occasionally she will have rapid atrial fibrillation. Had a pacemaker check recently. Now her back is acting up. Significant pain. It has resolved for many months. She is on Eliquis. No bleeding. She used to see Dr. Jailene Soto at [facility] and decided to follow with us. Has gained some weight. Laboratory noted. We will give her Ultram for short-term and only as needed use.   See me in 4 month        3/9/21: Patient presents today for follow-up of pacemaker. Urvashi Corcoran had seen her prior to back surgery when she presented with complete heart block.  Had a temporary pacemaker followed by permanent pacemaker.  She has had 3 prior back surgeries.  The back pain is completely resolved somehow after the pacemaker or less pains.  She used to see Dr. Jailene Soto at the [facility] but has switched to us.  Anticoagulated.  On diltiazem 120 a day.  She is also diabetic.  And is on losartan for renal protection.  Dyslipidemia is controlled.  She follows with the pacemaker clinic.  No definite chest pain or congestive heart failure symptoms.  She had ultrasound of the abdominal aorta.  We will follow that up during next visit.  She will see me in 4 months        11/3/2020: Patient presents today for follow-up of pacemaker. Gwyn Monique has presented to [facility] with atrial fibrillation with a slow ventricular rate and high-grade AV block.  Had a temporary pacemaker followed by permanent pacemaker.  She was supposed to back surgery.  Has had 3 prior back surgery.  Surprisingly after this hospitalization.  The back pain is completely resolved.  She used to see Dr. Guerita Juan at the [facility] but has decided to move her cardiology coverage to our practice. [place] much better. Daina Gasca still gets occasional palpitation. Gwyn Monique is on Eliquis.  She will see me in 4 months no bleeding     7/28/2020: Patient presents today for follow-up of pacemaker.  She had presented to ER with high-grade AV block and had a temporary pacemaker followed by permanent pacemaker.  She has some back issues.  Now after all this happened back is much better.  Few days ago she had sharp chest pain.  Not cardiac at all. Gwyn Monique is supposed to see Roni Farrell at the pacemaker clinic in about a month very apprehensive.  On Eliquis for A. fib.  And Cardizem 120 a day.  She is also diabetic.  On metformin and glipizide. Gwyn Monique does not want to take the Neurontin.  The pain is gone and she does not want to take it.  I told her that was between her and Dr. Nadya Guerrero discussion. I told her she needs to relax.  No restriction to activity.  She can lift about 15 to 20 pounds. Gwyn Monique will see me in 3 months.        6/17/2020: Patient presents today for follow-up of recent admission for complete heart block.  Had a emergent temporary pacemaker followed by permanent pacemaker.  She has had 3 back surgeries by Dr. Alexander Law being followed by . Crissy Zavala has history of chronic atrial fibrillation anticoagulated and was on Cardizem 120 at night.  This was restarted after the permanent pacemaker was inserted.  When she fell down she broke some ribs and she has ecchymosis on the left side still.  She had a routine chest x-ray done by  suggested that the patient had calcified aorta.  She had abdominal aorta ultrasound and we cannot track the report.   had wanted a CAT scan of the abdomen 12 will order to be done at [facility] and she will see me in the [place] office. Feliz Johnson will also hook her up with the pacemaker clinic                       Past Medical History:   Diagnosis Date    A-fib ([address]     Arthritis     Chronic back pain     Chronic back pain     Diabetes ([address]     Hx x 31 yrs    Hyperlipidemia     meds < 10 yrs    Hypertension     meds < 10 yrs    Osteoarthritis     spine       Past Surgical History:   Procedure Laterality Date    BACK SURGERY      x 3 ORs lumbar spine--last 2015    CARPAL TUNNEL RELEASE Bilateral 2000s   R [address] 10 with IOL OU   [address]      as child       Family History   Problem Relation Age of Onset    Arthritis Mother     High Blood Pressure Father     Heart Disease Father     Heart Failure Father     No Known Problems Sister     Heart Disease Son         cardiac stents    Diabetes Son     Diabetes Daughter         neuropathy    Thyroid Disease Daughter     Diabetes Sister     Kidney Disease Sister        Social History     Socioeconomic History    Marital status:       Spouse name: None    Number of children: None    Years of education: None    Highest education level: None   Occupational History    None   Tobacco Use    Smoking status: Never Smoker    Smokeless tobacco: Never Used   Vaping Use    Vaping Use: Never used   Substance and Sexual Activity    Alcohol use: Yes     Alcohol/week: 0.0 standard drinks     Comment: social rare    Drug use: No    Sexual activity: None   Other Topics Concern    None   Social History Narrative    None     Social Determinants of Health     Financial Resource Strain:     Difficulty of Paying Living Expenses:    Food Insecurity:     Worried About Running Out of Food in the Last Year:     Ran Out of Food in the Last Year:    Transportation Needs:     Lack of Transportation (Medical):  Lack of Transportation (Non-Medical):    Physical Activity:     Days of Exercise per Week:     Minutes of Exercise per Session:    Stress:     Feeling of Stress :    Social Connections:     Frequency of Communication with Friends and Family:     Frequency of Social Gatherings with Friends and Family:     Attends Taoism Services:     Active Member of Clubs or Organizations:     Attends Club or Organization Meetings:     Marital Status:    Intimate Partner Violence:     Fear of Current or Ex-Partner:     Emotionally Abused:     Physically Abused:     Sexually Abused: Allergies   Allergen Reactions    Other      Honeydew melon--throat closes & itching      Dilaudid [Hydromorphone Hcl] Rash       Current Outpatient Medications   Medication Sig Dispense Refill    traMADol (ULTRAM) 50 MG tablet Take 50 mg by mouth every 6 hours as needed for Pain.       latanoprost (XALATAN) 0.005 % ophthalmic solution instill 1 drop into both eyes at bedtime as directed      cyanocobalamin 1000 MCG tablet Take 1,000 mcg by mouth daily      Magnesium Oxide 500 MG TABS Take by mouth      pravastatin (PRAVACHOL) 20 MG tablet Take 1 tablet by mouth daily 90 tablet 3    ELIQUIS 5 MG TABS tablet Take 1 tablet by mouth 2 times daily OK to resume on Thursday 6/11/20 60 tablet 3    Cholecalciferol 50 MCG (2000 UT) CAPS Take 1 capsule by mouth daily      Coenzyme Q10 (COQ-10 PO) Take 200 mg by mouth daily      acetaminophen (TYLENOL) 325 MG tablet Take 650 mg by mouth every 6 hours as needed for Pain      docusate sodium (COLACE) 100 MG capsule Take 100 mg by mouth 2 times daily      diltiazem (DILTIAZEM CD) 120 MG extended release capsule Take 120 mg by mouth nightly       metFORMIN (GLUCOPHAGE) 1000 MG tablet Take 1,000 mg by mouth 2 times daily (with meals)      Biotin 1 MG CAPS Take 5,000 mcg by mouth daily       losartan (COZAAR) 25 MG tablet Take 25 mg by mouth nightly       glipiZIDE (GLUCOTROL XL) 2.5 MG extended release tablet Take 2.5 mg by mouth daily       No current facility-administered medications for this visit. Review of Systems:   Review of Systems   Constitutional: Negative for diaphoresis. HENT: Negative for nosebleeds. Respiratory: Negative for chest tightness and shortness of breath. Cardiovascular: Negative for chest pain, palpitations and leg swelling. Gastrointestinal: Negative for blood in stool, nausea and vomiting. Musculoskeletal: Positive for myalgias. Neurological: Positive for numbness. Negative for dizziness, seizures, syncope, weakness, light-headedness and headaches. Hematological: Does not bruise/bleed easily. Psychiatric/Behavioral: The patient is not nervous/anxious. Review of System is negative except for as mentioned above. Physical Examination:    /60 (Site: Left Upper Arm, Position: Sitting, Cuff Size: Medium Adult)   Pulse 85   Ht 5' 6\" (1.676 m)   Wt 180 lb (81.6 kg)   SpO2 98%   BMI 29.05 kg/m²    Physical Exam   Constitutional: She appears healthy. HENT:   Nose: Nose normal.   Mouth/Throat: Dentition is normal. Oropharynx is clear. Eyes: Pupils are equal, round, and reactive to light. Cardiovascular: Normal rate, regular rhythm, S1 normal, S2 normal, normal heart sounds, intact distal pulses and normal pulses. No extrasystoles are present.  Exam reveals no gallop. No murmur heard. Pulmonary/Chest: Effort normal and breath sounds normal. She has no wheezes. She has no rales. She exhibits no tenderness. Abdominal: Soft. Bowel sounds are normal. She exhibits no distension and no mass. There is no splenomegaly or hepatomegaly. There is no abdominal tenderness. Musculoskeletal:         General: No tenderness, deformity or edema. Normal range of motion. Cervical back: Normal range of motion. Neurological: She is alert and oriented to person, place, and time. She has normal motor skills and normal reflexes. Gait normal.   Skin: Skin is warm and dry. Patient Active Problem List   Diagnosis    Herniated lumbar disc without myelopathy    Lumbar degenerative disc disease    Spinal stenosis, lumbar region, without neurogenic claudication    Lumbosacral spondylosis without myelopathy    Foraminal stenosis of lumbar region    Lumbar spondylosis    [facility]    Diabetes mellitus type 2, uncomplicated (HCC)    Paralytic ileus (HCC)    Symptomatic bradycardia    SSS (sick sinus syndrome) (HCC)    Complete atrioventricular block (HCC)    Carpal tunnel syndrome    Essential hypertension    Hyperlipidemia    LBBB (left bundle branch block)    Primary localized osteoarthrosis, hand    Radicular syndrome of right leg                   Assessment/Orders:   Pacemaker  A. fib  Anticoagulated  Preop evaluation  Non vascular claudication      Plan:  See Dr Ijeoma Mccarthy  If She needs surgery, She will be acceptable risk for surgery. Eliquis has to be stopped 3 days before the surgery take all the cardiac medication the morning of surgery.             Electronically signed by: Charlene Russell MD  11/3/2021 1:00 PM

## 2021-11-03 ASSESSMENT — ENCOUNTER SYMPTOMS
NAUSEA: 0
VOMITING: 0
BLOOD IN STOOL: 0

## 2021-11-12 ENCOUNTER — HOSPITAL ENCOUNTER (OUTPATIENT)
Dept: CARDIOLOGY | Age: 76
Discharge: HOME | End: 2021-11-12
Payer: MEDICARE

## 2021-11-12 PROCEDURE — 93280 PM DEVICE PROGR EVAL DUAL: CPT

## 2022-02-11 ENCOUNTER — HOSPITAL ENCOUNTER (OUTPATIENT)
Dept: CARDIOLOGY | Age: 77
Discharge: HOME | End: 2022-02-11
Payer: MEDICARE

## 2022-02-11 PROCEDURE — 93296 REM INTERROG EVL PM/IDS: CPT

## 2022-03-18 ENCOUNTER — OFFICE VISIT (OUTPATIENT)
Dept: CARDIOLOGY CLINIC | Age: 77
End: 2022-03-18
Payer: MEDICARE

## 2022-03-18 VITALS
HEART RATE: 86 BPM | WEIGHT: 183 LBS | HEIGHT: 66 IN | DIASTOLIC BLOOD PRESSURE: 60 MMHG | SYSTOLIC BLOOD PRESSURE: 120 MMHG | BODY MASS INDEX: 29.41 KG/M2 | OXYGEN SATURATION: 97 %

## 2022-03-18 DIAGNOSIS — R00.1 SYMPTOMATIC BRADYCARDIA: ICD-10-CM

## 2022-03-18 DIAGNOSIS — I44.7 LBBB (LEFT BUNDLE BRANCH BLOCK): ICD-10-CM

## 2022-03-18 DIAGNOSIS — E78.2 MIXED HYPERLIPIDEMIA: ICD-10-CM

## 2022-03-18 DIAGNOSIS — I10 ESSENTIAL HYPERTENSION: ICD-10-CM

## 2022-03-18 DIAGNOSIS — I49.5 SSS (SICK SINUS SYNDROME) (HCC): ICD-10-CM

## 2022-03-18 DIAGNOSIS — I48.0 PAF (PAROXYSMAL ATRIAL FIBRILLATION) (HCC): Primary | ICD-10-CM

## 2022-03-18 DIAGNOSIS — I44.2 COMPLETE ATRIOVENTRICULAR BLOCK (HCC): ICD-10-CM

## 2022-03-18 PROCEDURE — 1090F PRES/ABSN URINE INCON ASSESS: CPT | Performed by: INTERNAL MEDICINE

## 2022-03-18 PROCEDURE — 1123F ACP DISCUSS/DSCN MKR DOCD: CPT | Performed by: INTERNAL MEDICINE

## 2022-03-18 PROCEDURE — G8427 DOCREV CUR MEDS BY ELIG CLIN: HCPCS | Performed by: INTERNAL MEDICINE

## 2022-03-18 PROCEDURE — 93000 ELECTROCARDIOGRAM COMPLETE: CPT | Performed by: INTERNAL MEDICINE

## 2022-03-18 PROCEDURE — 4040F PNEUMOC VAC/ADMIN/RCVD: CPT | Performed by: INTERNAL MEDICINE

## 2022-03-18 PROCEDURE — G8399 PT W/DXA RESULTS DOCUMENT: HCPCS | Performed by: INTERNAL MEDICINE

## 2022-03-18 PROCEDURE — 99214 OFFICE O/P EST MOD 30 MIN: CPT | Performed by: INTERNAL MEDICINE

## 2022-03-18 PROCEDURE — G8417 CALC BMI ABV UP PARAM F/U: HCPCS | Performed by: INTERNAL MEDICINE

## 2022-03-18 PROCEDURE — G8484 FLU IMMUNIZE NO ADMIN: HCPCS | Performed by: INTERNAL MEDICINE

## 2022-03-18 PROCEDURE — 1036F TOBACCO NON-USER: CPT | Performed by: INTERNAL MEDICINE

## 2022-03-18 RX ORDER — GABAPENTIN 300 MG/1
CAPSULE ORAL
COMMUNITY
Start: 2022-02-17

## 2022-03-18 RX ORDER — ROSUVASTATIN CALCIUM 20 MG/1
TABLET, COATED ORAL
COMMUNITY
Start: 2022-01-26

## 2022-03-18 NOTE — PROGRESS NOTES
Chief Complaint   Patient presents with    Follow-up    Atrial Fibrillation         [id number] is a 67 y.o. female who presents with a chief complaint of syncope. Patient is followed on a regular basis by Dr. Daysi Downs MD.  Patient with past medical history of paroxysmal atrial fibrillation, diabetes, hypertension, hyperlipidemia who presented with a syncopal episode. She states she was at her kitchen and experienced syncope without any warning signs. She was scheduled to have back surgery this Thursday with neuro spine care. She is on oral anticoagulation and took her last Eliquis dose this morning. She denied any chest pain, chest pressure heaviness. She denies any history of myocardial infarction or congestive heart failure. No history of cardiac catheterization. She was noted to have severe bradycardia with heart rate in the 34D and systolic blood pressure in the 60s on arrival to the emergency department. I was contacted by Dr. Yulissa Vyas for urgent transvenous pacemaker. She was started on dopamine drip as well. Her initial cardiac enzyme is negative, potassium is 4.5, magnesium of 1.6. She denies any history of thyroid disease. 3-18-22: Patient presents for initial medical evaluation. Patient is followed on a regular basis by Dr. Daysi Downs MD. Hx of Paroxysmal afib, SSS, PPM, HTN, HLD, DM  Pt denies chest pain, dyspnea, dyspnea on exertion, change in exercise capacity, fatigue,  nausea, vomiting, diarrhea, constipation, motor weakness, insomnia, weight loss, syncope, dizziness, lightheadedness, palpitations, PND, orthopnea, or claudication. S/p pacer check in 2/2022 with less than 0.1% afib. EKG with NSR, LBBB.            Patient Active Problem List   Diagnosis    Herniated lumbar disc without myelopathy    Lumbar degenerative disc disease    Spinal stenosis, lumbar region, without neurogenic claudication    Lumbosacral spondylosis without myelopathy    Foraminal stenosis of lumbar region    Lumbar spondylosis    [facility]    Diabetes mellitus type 2, uncomplicated (HCC)    Paralytic ileus (HCC)    Symptomatic bradycardia    SSS (sick sinus syndrome) (HCC)    Complete atrioventricular block (HCC)    Carpal tunnel syndrome    Essential hypertension    Hyperlipidemia    LBBB (left bundle branch block)    Primary localized osteoarthrosis, hand    Radicular syndrome of right leg       Past Surgical History:   Procedure Laterality Date    BACK SURGERY      x 3 ORs lumbar spine--last     CARPAL TUNNEL RELEASE Bilateral 2000s     SECTION   &     EYE SURGERY      [place] with IOL OU    HYSTERECTOMY  1987    SPINE SURGERY      TONSILLECTOMY      as child       Social History     Socioeconomic History    Marital status:      Spouse name: Not on file    Number of children: Not on file    Years of education: Not on file    Highest education level: Not on file   Occupational History    Not on file   Tobacco Use    Smoking status: Never Smoker    Smokeless tobacco: Never Used   Vaping Use    Vaping Use: Never used   Substance and Sexual Activity    Alcohol use: Yes     Alcohol/week: 0.0 standard drinks     Comment: social rare    Drug use: No    Sexual activity: Not on file   Other Topics Concern    Not on file   Social History Narrative    Not on file     Social Determinants of Health     Financial Resource Strain:     Difficulty of Paying Living Expenses: Not on file   Food Insecurity:     Worried About Running Out of Food in the Last Year: Not on file    Madi of Food in the Last Year: Not on file   Transportation Needs:     Lack of Transportation (Medical): Not on file    Lack of Transportation (Non-Medical):  Not on file   Physical Activity:     Days of Exercise per Week: Not on file    Minutes of Exercise per Session: Not on file   Stress:     Feeling of Stress : Not on file   Social Connections:     Frequency of Communication with Friends and Family: Not on file    Frequency of Social Gatherings with Friends and Family: Not on file    Attends Methodist Services: Not on file    Active Member of Clubs or Organizations: Not on file    Attends Club or Organization Meetings: Not on file    Marital Status: Not on file   Intimate Partner Violence:     Fear of Current or Ex-Partner: Not on file    Emotionally Abused: Not on file    Physically Abused: Not on file    Sexually Abused: Not on file   Housing Stability:     Unable to Pay for Housing in the Last Year: Not on file    Number of [place] in the Last Year: Not on file    Unstable Housing in the Last Year: Not on file       Family History   Problem Relation Age of Onset    Arthritis Mother     High Blood Pressure Father     Heart Disease Father     Heart Failure Father     No Known Problems Sister     Heart Disease Son         cardiac stents    Diabetes Son     Diabetes Daughter         neuropathy    Thyroid Disease Daughter     Diabetes Sister     Kidney Disease Sister        Current Outpatient Medications   Medication Sig Dispense Refill    rosuvastatin (CRESTOR) 20 MG tablet       gabapentin (NEURONTIN) 300 MG capsule take 1 capsule by mouth at bedtime      traMADol (ULTRAM) 50 MG tablet Take 50 mg by mouth every 6 hours as needed for Pain.       latanoprost (XALATAN) 0.005 % ophthalmic solution instill 1 drop into both eyes at bedtime as directed      cyanocobalamin 1000 MCG tablet Take 1,000 mcg by mouth daily      Magnesium Oxide 500 MG TABS Take by mouth      ELIQUIS 5 MG TABS tablet Take 1 tablet by mouth 2 times daily OK to resume on Thursday 6/11/20 60 tablet 3    Cholecalciferol 50 MCG (2000 UT) CAPS Take 1 capsule by mouth daily      Coenzyme Q10 (COQ-10 PO) Take 200 mg by mouth daily      acetaminophen (TYLENOL) 325 MG tablet Take 650 mg by mouth every 6 hours as needed for Pain      docusate sodium (COLACE) 100 MG capsule Take 100 mg by mouth 2 times daily      diltiazem (DILTIAZEM CD) 120 MG extended release capsule Take 120 mg by mouth nightly       metFORMIN (GLUCOPHAGE) 1000 MG tablet Take 1,000 mg by mouth 2 times daily (with meals)      Biotin 1 MG CAPS Take 5,000 mcg by mouth daily       losartan (COZAAR) 25 MG tablet Take 25 mg by mouth nightly       glipiZIDE (GLUCOTROL XL) 2.5 MG extended release tablet Take 2.5 mg by mouth daily       No current facility-administered medications for this visit. Other and Dilaudid [hydromorphone hcl]    Review of Systems:  General ROS: negative  Psychological ROS: negative  Hematological and Lymphatic ROS: No history of blood clots or bleeding disorder. Respiratory ROS: no cough, shortness of breath, or wheezing  Cardiovascular ROS: no chest pain or dyspnea on exertion  Gastrointestinal ROS: negative  Genito-Urinary ROS: no dysuria, trouble voiding, or hematuria  Musculoskeletal ROS: negative  Neurological ROS: no TIA or stroke symptoms  Dermatological ROS: negative    VITALS:  Blood pressure 120/60, pulse 86, height 5' 6\" (1.676 m), weight 183 lb (83 kg), SpO2 97 %. Body mass index is 29.54 kg/m². Physical Examination:  General appearance - alert, well appearing, and in no distress  Mental status - alert, oriented to person, place, and time  Neck - Neck is supple, no JVD or carotid bruits. No thyromegaly or adenopathy.    Chest - clear to auscultation, no wheezes, rales or rhonchi, symmetric air entry  Heart - normal rate, regular rhythm, normal S1, S2, no murmurs, rubs, clicks or gallops  Abdomen - soft, nontender, nondistended, no masses or organomegaly  Neurological - alert, oriented, normal speech, no focal findings or movement disorder noted  Extremities - peripheral pulses normal, no pedal edema, no clubbing or cyanosis  Skin - normal coloration and turgor, no rashes, no suspicious skin lesions noted      EKG: normal sinus rhythm, nonspecific ST and T waves changes    Orders Placed This Encounter   Procedures    EKG 12 lead       ASSESSMENT:     Diagnosis Orders   1. PAF (paroxysmal atrial fibrillation) ([facility])  EKG 12 lead   2. SSS (sick sinus syndrome) ([facility])  EKG 12 lead   3. Symptomatic bradycardia     4. Complete atrioventricular block (HCC)     5. Essential hypertension     6. Mixed hyperlipidemia     7. LBBB (left bundle branch block)           PLAN:         As always, aggressive risk factor modification is strongly recommended. We should adhere to the JNC VIII guidelines for HTN management and the NCEP ATP III guidelines for LDL-C management. Cardiac diet is always recommended with low fat, cholesterol, calories and sodium. Continue medications at current doses    Follow up in Pacer clinic    Check EKG    Cont with DOAC. Patient was advised and encouraged to check blood pressure at home or at a pharmacy, maintain a logbook, and also call us back if blood pressure are above the target ranges or if it is low. Patient clearly understands and agrees to the instructions. We will need to continue to monitor muscle and liver enzymes, BUN, CR, and electrolytes.

## 2022-05-13 ENCOUNTER — HOSPITAL ENCOUNTER (OUTPATIENT)
Dept: CARDIOLOGY | Age: 77
Discharge: HOME | End: 2022-05-13
Payer: MEDICARE

## 2022-05-13 PROCEDURE — 93280 PM DEVICE PROGR EVAL DUAL: CPT

## 2022-08-12 ENCOUNTER — HOSPITAL ENCOUNTER (OUTPATIENT)
Dept: CARDIOLOGY | Age: 77
Discharge: HOME | End: 2022-08-12
Payer: MEDICARE

## 2022-08-12 PROCEDURE — 93296 REM INTERROG EVL PM/IDS: CPT

## 2022-09-20 ENCOUNTER — OFFICE VISIT (OUTPATIENT)
Dept: CARDIOLOGY CLINIC | Age: 77
End: 2022-09-20
Payer: MEDICARE

## 2022-09-20 VITALS
BODY MASS INDEX: 29.21 KG/M2 | HEART RATE: 83 BPM | WEIGHT: 181 LBS | SYSTOLIC BLOOD PRESSURE: 130 MMHG | DIASTOLIC BLOOD PRESSURE: 80 MMHG

## 2022-09-20 DIAGNOSIS — I48.0 PAF (PAROXYSMAL ATRIAL FIBRILLATION) (HCC): Primary | ICD-10-CM

## 2022-09-20 DIAGNOSIS — I49.5 SSS (SICK SINUS SYNDROME) (HCC): ICD-10-CM

## 2022-09-20 DIAGNOSIS — R07.9 CHEST PAIN, UNSPECIFIED TYPE: ICD-10-CM

## 2022-09-20 PROCEDURE — 93000 ELECTROCARDIOGRAM COMPLETE: CPT | Performed by: NURSE PRACTITIONER

## 2022-09-20 PROCEDURE — 1090F PRES/ABSN URINE INCON ASSESS: CPT | Performed by: NURSE PRACTITIONER

## 2022-09-20 PROCEDURE — G8399 PT W/DXA RESULTS DOCUMENT: HCPCS | Performed by: NURSE PRACTITIONER

## 2022-09-20 PROCEDURE — 99214 OFFICE O/P EST MOD 30 MIN: CPT | Performed by: NURSE PRACTITIONER

## 2022-09-20 PROCEDURE — 1036F TOBACCO NON-USER: CPT | Performed by: NURSE PRACTITIONER

## 2022-09-20 PROCEDURE — 1123F ACP DISCUSS/DSCN MKR DOCD: CPT | Performed by: NURSE PRACTITIONER

## 2022-09-20 PROCEDURE — G8417 CALC BMI ABV UP PARAM F/U: HCPCS | Performed by: NURSE PRACTITIONER

## 2022-09-20 PROCEDURE — G8427 DOCREV CUR MEDS BY ELIG CLIN: HCPCS | Performed by: NURSE PRACTITIONER

## 2022-09-20 NOTE — PATIENT INSTRUCTIONS
Cardiac Perfusion Scan: About This Test  What is it? A cardiac perfusion scan measures the amount of blood in your heart muscle at rest and after your heart has been made to work hard. Medicine or exercise can be used to increase the amount of blood that your heart needs. During the scan, a camera takes pictures of your heart after a radioactive tracer is put into a vein in your arm. The tracer travels through the blood and into your heart. As the tracer moves through your heart, areas that have good blood flow absorb the tracer. Areas that don't absorb the tracer may not be getting enough blood or may have been damaged by a heart attack. The pictures show the difference. Two sets of pictures may be made during the test. One set is taken while you are resting. Another set is taken after your heart has been made to work harder (called a stress test). Why is this test done? The test is often done to find out what may be causing chest pain or pressure. It may be done after a heart attack to see if areas of the heart aren't getting enough blood. It also may be used to find out how much your heart has been damaged from the heart attack. How do you prepare for the test?  Tell your doctor ALL the medicines, vitamins, supplements, and herbal remedies you take. Some may increase the risk of problems during your test. Your doctor will tell you if you should stop taking any of them before the test and how soon to do it. If you take aspirin or some other blood thinner, ask your doctor if you should stop taking it before your test. Make sure that you understand exactly what your doctor wants you to do. These medicines increase the risk of bleeding. You may be told not to eat or drink for several hours before the scan. You may be told to avoid alcohol, tobacco, and drinks that have caffeine for at least 24 hours before the test.  Wear comfortable shoes, such as running shoes, and loose shorts or pants.  Don't wear jewelry to the test.  If you are breastfeeding, you may want to pump enough breast milk before the test to get through 1 to 2 days of feeding. The radioactive tracer used in this test can get into your breast milk and is not good for the baby. How is the test done? A cardiac perfusion test can be done while you're resting, after you exercise, or after you take medicine. Or you could have the test after taking medicine and exercising. Before the scans, electrodes will be attached to your chest to help record your heartbeats. You will have a tube, called an IV, put into your arm. Radioactive tracer will be put in the IV. For the resting scan, you will lie on a table. A camera above your chest records the tracer that has moved from your blood into your heart muscle. Several scans will be taken. They take 10 to 30 minutes each. For an exercise scan, you will walk on a treadmill or pedal a stationary bike. Then you have the scan. For a medicine scan, you are given a medicine in your IV that increases the amount of blood that your heart needs. Then you have the scan. How long does a cardiac perfusion scan take? Each scan may take about 30 to 60 minutes. How long the test takes will depend on how many scans you have and how long you wait between scans. What are the risks of a cardiac perfusion scan? Cardiac perfusion scans are usually safe. Anytime you're exposed to radiation, there's a small chance of damage to cells or tissue. That's the case even with the low-level radioactive tracer used for this test. But the chance of damage is very low compared with the benefits of the test.  There will be some risks when the test uses exercise or medicine to stress your heart. The amount of risk depends on the condition of your heart and your general level of health. The risks include:  Fainting. Chest pain. An irregular heartbeat. Heart attack.  There is a slight risk that death may result if a heart attack occurs during the test.  What happens after the test?  You can go home and back to your usual activities right away, unless you are already admitted to the hospital.  How can you care for yourself at home? Drink plenty of fluids for the next 24 hours to help flush the tracer out of your body. If you have kidney, heart, or liver disease and have to limit fluids, talk with your doctor before you increase the amount of fluids you drink. Most of the tracer will leave your body through your urine or stool within a day. So be sure to flush the toilet right after you use it, and wash your hands well with soap and water. The amount of radiation in the tracer is very small. This means it isn't a risk for people to be around you after the test.  Do not breastfeed your baby for 1 or 2 days after this test. During this time, you can give your baby breast milk you stored before the test, or you can give formula. When should you call for help? Call 911 anytime you think you may need emergency care. For example, call if:  You passed out (lost consciousness). You have been diagnosed with angina, and you have angina symptoms that do not go away with rest or are not getting better within 5 minutes after you take a dose of nitroglycerin. You have symptoms of a heart attack. These may include:  Chest pain or pressure, or a strange feeling in the chest.  Sweating. Shortness of breath. Nausea or vomiting. Pain, pressure, or a strange feeling in the back, neck, jaw, or upper belly or in one or both shoulders or arms. Lightheadedness or sudden weakness. A fast or irregular heartbeat. After you call 911, the  may tell you to chew 1 adult-strength or 2 to 4 low-dose aspirin. Wait for an ambulance. Do not try to drive yourself. Watch closely for changes in your health, and be sure to contact your doctor if you have any problems. Follow-up care is a key part of your treatment and safety.  Be sure to make and go to all appointments, and call your doctor if you are having problems. It's also a good idea to keep a list of the medicines you take. Ask your doctor when you can expect to have your test results. Where can you learn more? Go to https://alexus.[employer]. org and sign in to your [employer] account. Enter T239 in the [facility] box to learn more about \"Cardiac Perfusion Scan: About This Test.\"          Cardiac Perfusion Scan (Medicine): About This Test  What is it? A cardiac perfusion scan measures the amount of blood in your heart muscle at rest and after your heart has been made to work hard. Either medicine or exercise can be used to stress the heart. This information is about using medicine for this test.  During the scan, a camera takes pictures of your heart after a radioactive tracer is injected into a vein in your arm. The tracer travels through the blood and into your heart. As the tracer moves through your heart, areas that have good blood flow absorb the tracer. Areas that don't absorb the tracer may not be getting enough blood or may have been damaged by a heart attack. The pictures show this difference. Two sets of pictures may be made during the test. One set is taken while you are resting. Another set is taken after your heart has been made to work harder (called a stress test). Why is this test done? The test is often done to find out what may be causing chest pain or pressure. It may be done after a heart attack to see if areas of the heart are not getting enough blood or to find out how much your heart has been damaged from the heart attack. How do you prepare for the test?  Tell your doctor ALL the medicines, vitamins, supplements, and herbal remedies you take. Some may increase the risk of problems during your test. Your doctor will tell you if you should stop taking any of them before the test and how soon to do it.   If you take aspirin or some other blood thinner, ask your doctor if you should stop taking it before your test. Make sure that you understand exactly what your doctor wants you to do. These medicines increase the risk of bleeding. Tell your doctor if you are taking any erection-enhancing medicines (such as Cialis, Levitra, or Viagra). You may need to take nitroglycerin during this test, which can cause a serious reaction if you have taken an erection-enhancing medicine within the previous 48 hours. Do not have any caffeine, such as coffee or tea, for 24 hours before the test.  If you are breastfeeding, you may want to pump enough breast milk before the test to get through 1 to 2 days of feeding. The radioactive tracer used in this test can get into your breast milk and is not good for the baby. How is the test done? Resting or baseline scan  You will take your top off and be given a gown to wear. Electrodes will be attached to your chest to keep track of your heartbeats. You will have a tube, called an IV, going into your arm. A small amount of the radioactive tracer will be put in the IV. You will lie on your back or your stomach on a table with a large camera positioned above your chest. The camera records the tracer's signals as it moves through your blood. You will be asked to remain very still during each scan, which takes about 5 to 10 minutes. Several scans will be taken. Stress scan using medicine  The stress scan is done in two parts. In many hospitals, you first have the resting scan. You are then given a medicine that makes your heart work harder and you have another scan. Sometimes the stress scan is done first.  You will have a test called an electrocardiogram (EKG or ECG), which takes about 5 to 10 minutes. You may have other EKGs during and after the stress test.  Medicine will be put in your IV. It will make your heart work harder.  You may get a headache and feel dizzy, flushed, and nauseated from the medicine, but these symptoms usually do not last long. Your heartbeat and blood pressure may be checked. Your symptoms such as chest pain and shortness of breath will be checked. A few minutes after you get the medicine, another small amount of radioactive tracer is injected. You may be given something to reverse the medicine used to make your heart work harder. You will wait for 30 to 40 minutes and then have another resting scan. What else should you know about the test?  Sometimes more pictures are taken 2 to 4 hours after the stress scan. No electricity passes through your body during the test. There is no danger of getting an electrical shock. Anytime you're exposed to radiation, there's a small chance of damage to cells or tissue. That's the case even with the low-level radioactive tracer used for this test. But the chance of damage is very low compared with the benefits of the test.  Most of the tracer will leave your body through your urine or stool within a day. So be sure to flush the toilet right after you use it, and wash your hands well with soap and water. The amount of radiation in the tracer is very small. This means it isn't a risk for people to be around you after the test.  What happens after the test?  You will probably be able to go home right away. You can go back to your usual activities right away. When should you call for help? Call 911 anytime you think you may need emergency care. For example, call if:  You passed out (lost consciousness). You have been diagnosed with angina, and you have angina symptoms that do not go away with rest or are not getting better within 5 minutes after you take a dose of nitroglycerin. You have symptoms of a heart attack. These may include:  Chest pain or pressure, or a strange feeling in the chest.  Sweating. Shortness of breath. Nausea or vomiting. Pain, pressure, or a strange feeling in the back, neck, jaw, or upper belly or in one or both shoulders or arms.   Lightheadedness or breath or unexplained chest pain or pressure. Check how well your heart is pumping blood. Check to see how well your heart valves are working. Look for blood clots inside your heart. Measure the size and shape of the heart's chambers. Measure the blood pressure and speed of blood flow through the heart. How is the test done? You may be asked to remove your clothes above your waist. You may be given a cloth or paper covering to use during the test.  You will lie on your back or on your left side on a bed or table. You may receive medicine through a vein (intravenously, or IV). The IV can be used to give you a contrast material. This helps your doctor get good views of your heart. Small pads or patches (electrodes) will be placed on the skin of your chest to record your heart rate during the test.  A small amount of gel will be rubbed on the side of your chest to help  the sound waves. The transducer will be pressed firmly against your chest and moved slowly back and forth. It is usually moved to different areas on your chest or belly to get specific views of your heart. You will be asked to do several things, such as hold very still, breathe in and out very slowly, hold your breath, or lie on your left side. When the test is over, the gel is wiped off and the electrodes are removed. What are the risks of the test?  There are no known risks from having this test.  No electricity passes through your body during the test. There is no danger of getting an electrical shock. You do not receive any radiation. What happens after the test?  You will probably be able to go home right away. It depends on the reason for the test.  You can go back to your usual activities right away. Follow-up care is a key part of your treatment and safety. Be sure to make and go to all appointments, and call your doctor if you are having problems. It's also a good idea to keep a list of the medicines you take.  Ask your doctor when you can expect to have your test results. Current as of: April 29, 2021               Content Version: 13.0  © 3450-2886 [employer]. Care instructions adapted under license by [facility] ([facility]). If you have questions about a medical condition or this instruction, always ask your healthcare professional. [place] [address] any warranty or liability for your use of this information.

## 2022-09-20 NOTE — PROGRESS NOTES
Chief Complaint   Patient presents with    Other     PAIN AROUND PACEMAKER SITE    Atrial Fibrillation         [id number] is a 67 y.o. female who presents with a chief complaint of syncope. Patient is followed on a regular basis by Dr. Harmeet Vargas MD.  Patient with past medical history of paroxysmal atrial fibrillation, diabetes, hypertension, hyperlipidemia who presented with a syncopal episode. She states she was at her kitchen and experienced syncope without any warning signs. She was scheduled to have back surgery this Thursday with neuro spine care. She is on oral anticoagulation and took her last Eliquis dose this morning. She denied any chest pain, chest pressure heaviness. She denies any history of myocardial infarction or congestive heart failure. No history of cardiac catheterization. She was noted to have severe bradycardia with heart rate in the 95W and systolic blood pressure in the 60s on arrival to the emergency department. I was contacted by Dr. Lucie Kingsley for urgent transvenous pacemaker. She was started on dopamine drip as well. Her initial cardiac enzyme is negative, potassium is 4.5, magnesium of 1.6. She denies any history of thyroid disease. 3-18-22: Patient presents for initial medical evaluation. Patient is followed on a regular basis by Dr. Harmeet Vargas MD. Hx of Paroxysmal afib, SSS, PPM, HTN, HLD, DM  Pt denies chest pain, dyspnea, dyspnea on exertion, change in exercise capacity, fatigue,  nausea, vomiting, diarrhea, constipation, motor weakness, insomnia, weight loss, syncope, dizziness, lightheadedness, palpitations, PND, orthopnea, or claudication. S/p pacer check in 2/2022 with less than 0.1% afib. EKG with NSR, LBBB. 9/20/22: Pt seen in office today per her request.  Pt has history of Paroxysmal afib, SSS, PPM, HTN, HLD, DM. Pt reports she has been having episodes of left sided chest pain that radiates to her left shoulder and neck.   Denies aggravating or alleviating factors. States pain only lasts a few seconds before spontaneously resolving. Denies any assoc symptoms. States pain is located around her PPM.  Most recent pacemaker check on 22 with < 0.1% afib, 12 years battery life. Patient Active Problem List   Diagnosis    Herniated lumbar disc without myelopathy    Lumbar degenerative disc disease    Spinal stenosis, lumbar region, without neurogenic claudication    Lumbosacral spondylosis without myelopathy    Foraminal stenosis of lumbar region    Lumbar spondylosis    PAF (paroxysmal atrial fibrillation) ([facility]    Diabetes mellitus type 2, uncomplicated (HCC)    Paralytic ileus (HCC)    Symptomatic bradycardia    SSS (sick sinus syndrome) (HCC)    Complete atrioventricular block (HCC)    Carpal tunnel syndrome    Essential hypertension    Hyperlipidemia    LBBB (left bundle branch block)    Primary localized osteoarthrosis, hand    Radicular syndrome of right leg       Past Surgical History:   Procedure Laterality Date    BACK SURGERY      x 3 ORs lumbar spine--last     CARPAL TUNNEL RELEASE Bilateral 2000s     [place] with IOL [facility]    HYSTERECTOMY  1987    SPINE SURGERY      TONSILLECTOMY      as child       Social History     Socioeconomic History    Marital status:    Tobacco Use    Smoking status: Never    Smokeless tobacco: Never   Vaping Use    Vaping Use: Never used   Substance and Sexual Activity    Alcohol use:  Yes     Alcohol/week: 0.0 standard drinks     Comment: social rare    Drug use: No       Family History   Problem Relation Age of Onset    Arthritis Mother     High Blood Pressure Father     Heart Disease Father     Heart Failure Father     No Known Problems Sister     Heart Disease Son         cardiac stents    Diabetes Son     Diabetes Daughter         neuropathy    Thyroid Disease Daughter     Diabetes Sister     Kidney Disease Sister        Current kg).  Body mass index is 29.21 kg/m². Physical Examination:  General appearance - alert, well appearing, and in no distress  Mental status - alert, oriented to person, place, and time  Neck - Neck is supple, no JVD or carotid bruits. No thyromegaly or adenopathy. Chest - clear to auscultation, no wheezes, rales or rhonchi, symmetric air entry  Heart - normal rate, regular rhythm, normal S1, S2, no murmurs, rubs, clicks or gallops  Abdomen - soft, nontender, nondistended, no masses or organomegaly  Neurological - alert, oriented, normal speech, no focal findings or movement disorder noted  Extremities - peripheral pulses normal, no pedal edema, no clubbing or cyanosis  Skin - normal coloration and turgor, no rashes, no suspicious skin lesions noted      EKG: normal sinus rhythm, nonspecific ST and T waves changes    Orders Placed This Encounter   Procedures    NM MYOCARDIAL SPECT REST EXERCISE OR RX    XR CHEST STANDARD (2 VW)    EKG 12 Lead    ECHO Complete 2D W Doppler W Color       ASSESSMENT:     Diagnosis Orders   1. PAF (paroxysmal atrial fibrillation) ([facility])  EKG 12 Lead    NM MYOCARDIAL SPECT REST EXERCISE OR RX    ECHO Complete 2D W Doppler W Color      2. Chest pain, unspecified type  NM MYOCARDIAL SPECT REST EXERCISE OR RX    ECHO Complete 2D W Doppler W Color    XR CHEST STANDARD (2 VW)      3. SSS (sick sinus syndrome) ([facility])              PLAN:     CXR today     Non invasive cardiac testing will be ordered to further evaluate for any ischemic or structural heart disease as a cause of the patient symptoms. We will proceed with a Cardiolite stress test and echo     As always, aggressive risk factor modification is strongly recommended. We should adhere to the JNC VIII guidelines for HTN management and the NCEP ATP III guidelines for LDL-C management. Cardiac diet is always recommended with low fat, cholesterol, calories and sodium.     Continue medications at current doses    Follow up in Pacer

## 2022-09-27 ENCOUNTER — HOSPITAL ENCOUNTER (OUTPATIENT)
Dept: NON INVASIVE DIAGNOSTICS | Age: 77
Discharge: HOME | End: 2022-09-27
Payer: MEDICARE

## 2022-09-27 ENCOUNTER — HOSPITAL ENCOUNTER (OUTPATIENT)
Dept: NUCLEAR MEDICINE | Age: 77
Discharge: HOME | End: 2022-09-29
Payer: MEDICARE

## 2022-09-27 DIAGNOSIS — R07.9 CHEST PAIN, UNSPECIFIED TYPE: ICD-10-CM

## 2022-09-27 DIAGNOSIS — I48.0 PAF (PAROXYSMAL ATRIAL FIBRILLATION) (HCC): ICD-10-CM

## 2022-09-27 LAB
LV EF: 58 %
LV EF: 67 %
LVEF MODALITY: NORMAL
LVEF MODALITY: NORMAL

## 2022-09-27 PROCEDURE — 93306 TTE W/DOPPLER COMPLETE: CPT

## 2022-09-27 PROCEDURE — 2580000003 HC RX 258: Performed by: NURSE PRACTITIONER

## 2022-09-27 PROCEDURE — 6360000002 HC RX W HCPCS: Performed by: NURSE PRACTITIONER

## 2022-09-27 PROCEDURE — 93017 CV STRESS TEST TRACING ONLY: CPT

## 2022-09-27 PROCEDURE — 3430000000 HC RX DIAGNOSTIC RADIOPHARMACEUTICAL: Performed by: NURSE PRACTITIONER

## 2022-09-27 PROCEDURE — A9502 TC99M TETROFOSMIN: HCPCS | Performed by: NURSE PRACTITIONER

## 2022-09-27 PROCEDURE — 78452 HT MUSCLE IMAGE SPECT MULT: CPT

## 2022-09-27 PROCEDURE — 93018 CV STRESS TEST I&R ONLY: CPT | Performed by: INTERNAL MEDICINE

## 2022-09-27 RX ORDER — SODIUM CHLORIDE 0.9 % (FLUSH) 0.9 %
10 SYRINGE (ML) INJECTION PRN
Status: COMPLETED | OUTPATIENT
Start: 2022-09-27 | End: 2022-09-27

## 2022-09-27 RX ADMIN — TETROFOSMIN 11.1 MILLICURIE: 1.38 INJECTION, POWDER, LYOPHILIZED, FOR SOLUTION INTRAVENOUS at 08:45

## 2022-09-27 RX ADMIN — Medication 10 ML: at 10:20

## 2022-09-27 RX ADMIN — Medication 10 ML: at 08:45

## 2022-09-27 RX ADMIN — REGADENOSON 0.4 MG: 0.08 INJECTION, SOLUTION INTRAVENOUS at 10:20

## 2022-09-27 RX ADMIN — TETROFOSMIN 33.5 MILLICURIE: 1.38 INJECTION, POWDER, LYOPHILIZED, FOR SOLUTION INTRAVENOUS at 10:20

## 2022-09-27 RX ADMIN — Medication 10 ML: at 10:21

## 2022-09-27 NOTE — PROGRESS NOTES
Reviewed history, allergies, and medications. Patient held her home medications prior to testing. Consent confirmed. Lexiscan exam explained. Placed patient on monitor. @ [place] [id number] here to inject [employer]. SOB noted during recovery phase. Denied chest pain. No ectopy noted. Doctor to further review and interpret results. Patient off monitor and instructed to eat, will have last part of exam in 1 hour.

## 2022-10-03 ENCOUNTER — OFFICE VISIT (OUTPATIENT)
Dept: CARDIOLOGY CLINIC | Age: 77
End: 2022-10-03
Payer: MEDICARE

## 2022-10-03 VITALS
DIASTOLIC BLOOD PRESSURE: 60 MMHG | SYSTOLIC BLOOD PRESSURE: 122 MMHG | WEIGHT: 182.2 LBS | HEART RATE: 110 BPM | BODY MASS INDEX: 29.41 KG/M2 | OXYGEN SATURATION: 95 %

## 2022-10-03 DIAGNOSIS — I48.0 PAF (PAROXYSMAL ATRIAL FIBRILLATION) (HCC): Chronic | ICD-10-CM

## 2022-10-03 DIAGNOSIS — E78.2 MIXED HYPERLIPIDEMIA: ICD-10-CM

## 2022-10-03 DIAGNOSIS — I49.5 SSS (SICK SINUS SYNDROME) (HCC): ICD-10-CM

## 2022-10-03 DIAGNOSIS — I10 ESSENTIAL HYPERTENSION: ICD-10-CM

## 2022-10-03 DIAGNOSIS — Z71.2 ENCOUNTER TO DISCUSS TEST RESULTS: Primary | ICD-10-CM

## 2022-10-03 PROCEDURE — G8427 DOCREV CUR MEDS BY ELIG CLIN: HCPCS | Performed by: NURSE PRACTITIONER

## 2022-10-03 PROCEDURE — 1123F ACP DISCUSS/DSCN MKR DOCD: CPT | Performed by: NURSE PRACTITIONER

## 2022-10-03 PROCEDURE — G8484 FLU IMMUNIZE NO ADMIN: HCPCS | Performed by: NURSE PRACTITIONER

## 2022-10-03 PROCEDURE — 1036F TOBACCO NON-USER: CPT | Performed by: NURSE PRACTITIONER

## 2022-10-03 PROCEDURE — G8417 CALC BMI ABV UP PARAM F/U: HCPCS | Performed by: NURSE PRACTITIONER

## 2022-10-03 PROCEDURE — 99214 OFFICE O/P EST MOD 30 MIN: CPT | Performed by: NURSE PRACTITIONER

## 2022-10-03 PROCEDURE — 1090F PRES/ABSN URINE INCON ASSESS: CPT | Performed by: NURSE PRACTITIONER

## 2022-10-03 PROCEDURE — G8399 PT W/DXA RESULTS DOCUMENT: HCPCS | Performed by: NURSE PRACTITIONER

## 2022-10-03 RX ORDER — SODIUM CHLORIDE 0.9 % (FLUSH) 0.9 %
5-40 SYRINGE (ML) INJECTION PRN
Status: CANCELLED | OUTPATIENT
Start: 2022-10-03

## 2022-10-03 RX ORDER — DIPHENHYDRAMINE HCL 25 MG
50 TABLET ORAL ONCE
OUTPATIENT
Start: 2022-10-03 | End: 2022-10-03

## 2022-10-03 RX ORDER — NITROGLYCERIN 0.4 MG/1
0.4 TABLET SUBLINGUAL EVERY 5 MIN PRN
Status: CANCELLED | OUTPATIENT
Start: 2022-10-03

## 2022-10-03 RX ORDER — ONDANSETRON 2 MG/ML
4 INJECTION INTRAMUSCULAR; INTRAVENOUS EVERY 6 HOURS PRN
OUTPATIENT
Start: 2022-10-03

## 2022-10-03 RX ORDER — ASPIRIN 81 MG/1
81 TABLET ORAL ONCE
Status: CANCELLED | OUTPATIENT
Start: 2022-10-03 | End: 2022-10-03

## 2022-10-03 RX ORDER — SODIUM CHLORIDE 9 MG/ML
INJECTION, SOLUTION INTRAVENOUS PRN
Status: CANCELLED | OUTPATIENT
Start: 2022-10-03

## 2022-10-03 RX ORDER — SODIUM CHLORIDE 0.9 % (FLUSH) 0.9 %
5-40 SYRINGE (ML) INJECTION EVERY 12 HOURS SCHEDULED
Status: CANCELLED | OUTPATIENT
Start: 2022-10-03

## 2022-10-03 RX ORDER — PREDNISONE 1 MG/1
50 TABLET ORAL ONCE
OUTPATIENT
Start: 2022-10-03 | End: 2022-10-03

## 2022-10-03 NOTE — PROGRESS NOTES
Chief Complaint   Patient presents with    Results     STRESS TEST, ECHOCARDIOGRAM AND CHEST X-RAY         [id number] is a 67 y.o. female who presents with a chief complaint of syncope. Patient is followed on a regular basis by Dr. Sandra Engle MD.  Patient with past medical history of paroxysmal atrial fibrillation, diabetes, hypertension, hyperlipidemia who presented with a syncopal episode. She states she was at her kitchen and experienced syncope without any warning signs. She was scheduled to have back surgery this Thursday with neuro spine care. She is on oral anticoagulation and took her last Eliquis dose this morning. She denied any chest pain, chest pressure heaviness. She denies any history of myocardial infarction or congestive heart failure. No history of cardiac catheterization. She was noted to have severe bradycardia with heart rate in the 97N and systolic blood pressure in the 60s on arrival to the emergency department. I was contacted by Dr. Brennen Bell for urgent transvenous pacemaker. She was started on dopamine drip as well. Her initial cardiac enzyme is negative, potassium is 4.5, magnesium of 1.6. She denies any history of thyroid disease. 3-18-22: Patient presents for initial medical evaluation. Patient is followed on a regular basis by Dr. Sandra Engle MD. Hx of Paroxysmal afib, SSS, PPM, HTN, HLD, DM  Pt denies chest pain, dyspnea, dyspnea on exertion, change in exercise capacity, fatigue,  nausea, vomiting, diarrhea, constipation, motor weakness, insomnia, weight loss, syncope, dizziness, lightheadedness, palpitations, PND, orthopnea, or claudication. S/p pacer check in 2/2022 with less than 0.1% afib. EKG with NSR, LBBB. 9/20/22: Pt seen in office today per her request.  Pt has history of Paroxysmal afib, SSS, PPM, HTN, HLD, DM. Pt reports she has been having episodes of left sided chest pain that radiates to her left shoulder and neck.   Denies aggravating or alleviating factors. States pain only lasts a few seconds before spontaneously resolving. Denies any assoc symptoms. States pain is located around her PPM.  Most recent pacemaker check on 8/11/22 with < 0.1% afib, 12 years battery life. 10/3/2022: Patient seen in office today to discuss test results. Patient had echocardiogram on 9/27/2022 which showed:   Summary   Normal left ventricle structure and function. Left ventricular ejection fraction is visually estimated at 55-60%. E/A flow reversal noted. Suggestive of diastolic dysfunction. Patient had stress test on 9/27/2022 which shows:    PROBABLE PHYSIOLOGIC APICAL PERFUSION DEFECT. NO EVIDENCE OF REVERSIBLE ISCHEMIA. NORMAL LEFT VENTRICULAR EJECTION FRACTION. Patient reports continued episodes of left-sided chest pain that radiates through to her back, into left shoulder and down left arm. She states pain can occur at rest or with exertion. States she is very concerned and states that something \"just does not feel right \". Pt denies  dyspnea, dyspnea on exertion, change in exercise capacity, fatigue,  nausea, vomiting, diarrhea, constipation, motor weakness, insomnia, weight loss, syncope, dizziness, lightheadedness, palpitations, PND, orthopnea, or claudication. No bleeding issues. Pt denies any CHF type symptoms. No recent hospitalizations. Pt is compliant with all Rx medications. Blood pressure and heart rate are under control.        Patient Active Problem List   Diagnosis    Herniated lumbar disc without myelopathy    Lumbar degenerative disc disease    Spinal stenosis, lumbar region, without neurogenic claudication    Lumbosacral spondylosis without myelopathy    Foraminal stenosis of lumbar region    Lumbar spondylosis    PAF (paroxysmal atrial fibrillation) ([facility] [address]    Diabetes mellitus type 2, uncomplicated (HCC)    Paralytic ileus (HCC)    Symptomatic bradycardia    SSS (sick sinus syndrome) (HCC)    Complete atrioventricular block (HCC)    Carpal tunnel syndrome    Essential hypertension    Hyperlipidemia    LBBB (left bundle branch block)    Primary localized osteoarthrosis, hand    Radicular syndrome of right leg       Past Surgical History:   Procedure Laterality Date    BACK SURGERY      x 3 ORs lumbar spine--last     CARPAL TUNNEL RELEASE Bilateral 2000s     [place] with IOL [facility]    HYSTERECTOMY  1987    SPINE SURGERY      TONSILLECTOMY      as child       Social History     Socioeconomic History    Marital status:    Tobacco Use    Smoking status: Never    Smokeless tobacco: Never   Vaping Use    Vaping Use: Never used   Substance and Sexual Activity    Alcohol use:  Yes     Alcohol/week: 0.0 standard drinks     Comment: social rare    Drug use: No       Family History   Problem Relation Age of Onset    Arthritis Mother     High Blood Pressure Father     Heart Disease Father     Heart Failure Father     No Known Problems Sister     Heart Disease Son         cardiac stents    Diabetes Son     Diabetes Daughter         neuropathy    Thyroid Disease Daughter     Diabetes Sister     Kidney Disease Sister        Current Outpatient Medications   Medication Sig Dispense Refill    rosuvastatin (CRESTOR) 20 MG tablet       gabapentin (NEURONTIN) 300 MG capsule take 1 capsule by mouth at bedtime      traMADol (ULTRAM) 50 MG tablet Take 50 mg by mouth every 6 hours as needed for Pain.      latanoprost (XALATAN) 0.005 % ophthalmic solution instill 1 drop into both eyes at bedtime as directed      cyanocobalamin 1000 MCG tablet Take 1,000 mcg by mouth daily      Magnesium Oxide 500 MG TABS Take by mouth      ELIQUIS 5 MG TABS tablet Take 1 tablet by mouth 2 times daily OK to resume on 20 60 tablet 3    Cholecalciferol 50 MCG (2000) CAPS Take 1 capsule by mouth daily      Coenzyme Q10 (COQ-10 PO) Take 200 mg by mouth daily      acetaminophen (TYLENOL) 325 MG tablet Take 650 mg by mouth every 6 hours as needed for Pain      docusate sodium (COLACE) 100 MG capsule Take 100 mg by mouth 2 times daily      dilTIAZem (CARDIZEM CD) 120 MG extended release capsule Take 120 mg by mouth nightly       metFORMIN (GLUCOPHAGE) 1000 MG tablet Take 1,000 mg by mouth 2 times daily (with meals)      Biotin 1 MG CAPS Take 5,000 mcg by mouth daily       losartan (COZAAR) 25 MG tablet Take 25 mg by mouth nightly       glipiZIDE (GLUCOTROL XL) 2.5 MG extended release tablet Take 2.5 mg by mouth daily       No current facility-administered medications for this visit. Other and Dilaudid [hydromorphone hcl]    Review of Systems:  General ROS: negative  Psychological ROS: negative  Hematological and Lymphatic ROS: No history of blood clots or bleeding disorder. Respiratory ROS: no cough, shortness of breath, or wheezing  Cardiovascular ROS: no chest pain or dyspnea on exertion  Gastrointestinal ROS: negative  Genito-Urinary ROS: no dysuria, trouble voiding, or hematuria  Musculoskeletal ROS: negative  Neurological ROS: no TIA or stroke symptoms  Dermatological ROS: negative    VITALS:  Blood pressure 122/60, pulse (!) 110, weight 182 lb 3.2 oz (82.6 kg), SpO2 95 %. Body mass index is 29.41 kg/m². Physical Examination:  General appearance - alert, well appearing, and in no distress  Mental status - alert, oriented to person, place, and time  Neck - Neck is supple, no JVD or carotid bruits. No thyromegaly or adenopathy.    Chest - clear to auscultation, no wheezes, rales or rhonchi, symmetric air entry  Heart - normal rate, regular rhythm, normal S1, S2, no murmurs, rubs, clicks or gallops  Abdomen - soft, nontender, nondistended, no masses or organomegaly  Neurological - alert, oriented, normal speech, no focal findings or movement disorder noted  Extremities - peripheral pulses normal, no pedal edema, no clubbing or cyanosis  Skin - normal coloration and turgor, no rashes, no suspicious skin lesions noted      EKG: normal sinus rhythm, nonspecific ST and T waves changes    Orders Placed This Encounter   Procedures    Initiate PAT Protocol       ASSESSMENT:     Diagnosis Orders   1. Encounter to discuss test results        2. Essential hypertension        3. Mixed hyperlipidemia        4. PAF (paroxysmal atrial fibrillation) ([address]        5. SSS (sick sinus syndrome) ([facility])              PLAN:     Had a long talk with the patient regarding their symptoms, test results and the different treatment options available which include cardiac cath, alternate imaging modality and medical therapy. Patient would like to proceed with cardiac catheterization and understands the risks and benefits of the procedure. As always, aggressive risk factor modification is strongly recommended. We should adhere to the JNC VIII guidelines for HTN management and the NCEP ATP III guidelines for LDL-C management. Cardiac diet is always recommended with low fat, cholesterol, calories and sodium. Continue medications at current doses    Follow up in Pacer clinic    Check EKG    Cont with DOAC. Patient was advised and encouraged to check blood pressure at home or at a pharmacy, maintain a logbook, and also call us back if blood pressure are above the target ranges or if it is low. Patient clearly understands and agrees to the instructions. We will need to continue to monitor muscle and liver enzymes, BUN, CR, and electrolytes.

## 2022-10-03 NOTE — PATIENT INSTRUCTIONS
Coronary Angiogram with Possible Treatment: Before Your Procedure  What is a coronary angiogram?     A coronary angiogram is a test to look at the blood vessels of your heart. These are called the coronary arteries. You may have this test to see if any of these arteries are narrowed or blocked. The test may also be used to measure the pressure in your heart's chambers. A doctor will put a thin, flexible tube into a blood vessel in your upper leg or groin. This tube is called a catheter. In some cases, the doctor may insert it in a blood vessel near your elbow or wrist.  During the test, the doctor moves the catheter through the blood vessel and into your heart. Then the doctor puts a dye into the catheter. This makes your coronary arteries show up on a screen. Your doctor can see if the arteries are blocked or narrowed. If you have a narrowed or blocked artery, the doctor may do an angioplasty or a coronary stent procedure. In an angioplasty, the doctor puts a catheter with a tiny balloon at the tip into the blocked area and inflates it. The balloon presses the fatty buildup (plaque) against the walls of the artery. This makes more room for blood to flow. In most cases, the doctor then puts a stent in the artery. A stent is a small, expandable tube. It presses against the walls of the artery. The stent is left in the artery to keep it open. This helps blood flow. The catheter is removed from your body. Follow-up care is a key part of your treatment and safety. Be sure to make and go to all appointments, and call your doctor if you are having problems. It's also a good idea to know your test results and keep a list of the medicines you take. How do you prepare for the procedure? Procedures can be stressful. This information will help you understand what you can expect. And it will help you safely prepare for your procedure. Preparing for the procedure    Be sure you have someone to take you home.  Anesthesia and pain medicine will make it unsafe for you to drive or get home on your own. Understand exactly what procedure is planned, along with the risks, benefits, and other options. Tell your doctor ALL the medicines, vitamins, supplements, and herbal remedies you take. Some may increase the risk of problems during your procedure. Your doctor will tell you if you should stop taking any of them before the procedure and how soon to do it. If you take aspirin or some other blood thinner, ask your doctor if you should stop taking it before your procedure. Make sure that you understand exactly what your doctor wants you to do. These medicines increase the risk of bleeding. Make sure your doctor and the hospital have a copy of your advance directive. If you don't have one, you may want to prepare one. It lets others know your health care wishes. It's a good thing to have before any type of surgery or procedure. What happens on the day of the procedure? Follow the instructions exactly about when to stop eating and drinking. If you don't, your procedure may be canceled. If your doctor told you to take your medicines on the day of the procedure, take them with only a sip of water. Take a bath or shower before you come in for your procedure. Do not apply lotions, perfumes, deodorants, or nail polish. Do not shave the procedure site yourself. Take off all jewelry and piercings. And take out contact lenses, if you wear them. At the hospital or surgery center   Bring a picture ID. You will be kept comfortable and safe by your anesthesia provider. You may get medicine that relaxes you or puts you in a light sleep. The area being worked on will be numb. After the procedure, pressure will be applied to the area where the catheter was put into your artery. Then you may have a bandage or a compression device on your groin or arm at the catheter insertion site. This will prevent bleeding.      Nurses will check your heart rate and blood pressure. The nurse also will check the catheter site for bleeding. If the catheter was put in your groin, you will need to lie still and keep your leg straight for several hours. The nurse may put a weighted bag on your leg to help you keep it still. If the catheter was put in your arm, you may be able to sit up and get out of bed right away. But you will need to keep your arm still for at least one hour. You may be able to go home later the same day, or you may need to stay in the hospital overnight. You may have a bruise where the catheter was put in your groin or arm. This is normal and will go away. When should you call your doctor? You have questions or concerns. You don't understand how to prepare for your procedure. You become ill before the procedure (such as fever, flu, or a cold). You need to reschedule or have changed your mind about having the procedure. Where can you learn more? Go to https://[employer]. org and sign in to your [employer] account. Enter [id number] in the [employer] [facility] box to learn more about \"Coronary Angiogram with Possible Treatment: Before Your Procedure. \"     If you do not have an account, please click on the \"Sign Up Now\" link. Current as of: April 29, 2021               Content Version: 13.0  © 2006-2021 Healthwise, Incorporated. Care instructions adapted under license by [facility] ([facility]). If you have questions about a medical condition or this instruction, always ask your healthcare professional. [place] [address] any warranty or liability for your use of this information. Coronary Angiogram: What to Expect at [facility]     A coronary angiogram is a test to examine the large blood vessel of your heart (coronary artery). The doctor inserted a thin, flexible tube (catheter) into a blood vessel in your groin.  In some cases, the catheter is placed in a blood vessel in the arm. Your groin or arm may have a bruise and feel sore for a day or two after the procedure. You can do light activities around the house but nothing strenuous for several days. This care sheet gives you a general idea about how long it will take for you to recover. But each person recovers at a different pace. Follow the steps below to feel better as quickly as possible. How can you care for yourself at home? Activity    If the doctor gave you a sedative: For 24 hours, don't do anything that requires attention to detail, such as going to work, making important decisions, or signing any legal documents. It takes time for the medicine's effects to completely wear off. For your safety, do not drive or operate any machinery that could be dangerous. Wait until the medicine wears off and you can think clearly and react easily. Do not do strenuous exercise and do not lift, pull, or push anything heavy until your doctor says it is okay. This may be for a day or two. You can walk around the house and do light activity, such as cooking. If the catheter was placed in your groin, try not to walk up stairs for the first couple of days. If the catheter was placed in your arm near your wrist, do not bend your wrist deeply for the first couple of days. Be careful using your hand to get into and out of a chair or bed. If your doctor recommends it, get more exercise. Walking is a good choice. Bit by bit, increase the amount you walk every day. Try for at least 30 minutes on most days of the week. Diet    Drink plenty of fluids to help your body flush out the dye. If you have kidney, heart, or liver disease and have to limit fluids, talk with your doctor before you increase the amount of fluids you drink. Keep eating a heart-healthy diet that has lots of fruits, vegetables, and whole grains. If you have not been eating this way, talk to your doctor. You also may want to talk to a dietitian. This expert can help you to learn about healthy foods and plan meals. Medicines    Your doctor will tell you if and when you can restart your medicines. He or she will also give you instructions about taking any new medicines. If you take aspirin or some other blood thinner, ask your doctor if and when to start taking it again. Make sure that you understand exactly what your doctor wants you to do. Your doctor may prescribe a blood-thinning medicine like aspirin or clopidogrel (Plavix). It is very important that you take these medicines exactly as directed in order to keep the coronary artery open and reduce your risk of a heart attack. Be safe with medicines. Call your doctor if you think you are having a problem with your medicine. Care of the catheter site    For 1 or 2 days, keep a bandage over the spot where the catheter was inserted. The bandage probably will fall off in this time. Put ice or a cold pack on the area for 10 to 20 minutes at a time to help with soreness or swelling. Put a thin cloth between the ice and your skin. You may shower 24 to 48 hours after the procedure, if your doctor okays it. Pat the incision dry. Do not soak the catheter site until it is healed. Don't take a bath for 1 week, or until your doctor tells you it is okay. Watch for bleeding from the site. A small amount of blood (up to the size of a quarter) on the bandage can be normal.     If you are bleeding, lie down and press on the area for 15 minutes to try to make it stop. If the bleeding does not stop, call your doctor or seek immediate medical care. Follow-up care is a key part of your treatment and safety. Be sure to make and go to all appointments, and call your doctor if you are having problems. It's also a good idea to know your test results and keep a list of the medicines you take. When should you call for help? Call 911 anytime you think you may need emergency care.  For example, call if: You passed out (lost consciousness). You have severe trouble breathing. You have sudden chest pain and shortness of breath, or you cough up blood. You have symptoms of a heart attack. These may include:  Chest pain or pressure, or a strange feeling in the chest.  Sweating. Shortness of breath. Nausea or vomiting. Pain, pressure, or a strange feeling in the back, neck, jaw, or upper belly, or in one or both shoulders or arms. Lightheadedness or sudden weakness. A fast or irregular heartbeat. After you call 911, the  may tel you to chew 1 adult-strength or 2 to 4 low-dose aspirin. Wait for an ambulance. Do not try to drive yourself. You have been diagnosed with angina, and you have symptoms that do not go away with rest or are not getting better within 5 minutes after you take a dose of nitroglycerin. Call your doctor now or seek immediate medical care if:    You are bleeding from the area where the catheter was put in your artery. You have a fast-growing, painful lump at the catheter site. You have signs of infection, such as: Increased pain, swelling, warmth, or redness. Red streaks leading from the catheter site. Pus draining from the catheter site. A fever. Your leg, arm, or hand is painful, looks blue, or feels cold, numb, or tingly. Watch closely for changes in your health, and be sure to contact your doctor if you have any problems. Where can you learn more? Go to https://[employer]. org and sign in to your [employer] account. Enter M284 in the [facility] box to learn more about \"Coronary Angiogram: What to Expect at Home. \"     If you do not have an account, please click on the \"Sign Up Now\" link. Current as of: April 29, 2021               Content Version: 13.0  © 2006-2021 Healthwise, Incorporated. Care instructions adapted under license by [facility] ([facility]).  If you have questions about a medical condition or this instruction, always ask your healthcare professional. [place] [address] any warranty or liability for your use of this information.

## 2022-10-14 ENCOUNTER — HOSPITAL ENCOUNTER (OUTPATIENT)
Dept: CARDIAC CATH/INVASIVE PROCEDURES | Age: 77
Discharge: HOME | End: 2022-10-14
Attending: INTERNAL MEDICINE | Admitting: INTERNAL MEDICINE
Payer: MEDICARE

## 2022-10-14 VITALS
BODY MASS INDEX: 29.27 KG/M2 | WEIGHT: 182.1 LBS | HEIGHT: 66 IN | RESPIRATION RATE: 18 BRPM | DIASTOLIC BLOOD PRESSURE: 68 MMHG | SYSTOLIC BLOOD PRESSURE: 151 MMHG | HEART RATE: 69 BPM | TEMPERATURE: 97.5 F | OXYGEN SATURATION: 99 %

## 2022-10-14 PROBLEM — I20.8 ANGINA AT REST (HCC): Status: ACTIVE | Noted: 2022-10-14

## 2022-10-14 PROBLEM — I20.89 ANGINA AT REST: Status: ACTIVE | Noted: 2022-10-14

## 2022-10-14 LAB
ANION GAP SERPL CALCULATED.3IONS-SCNC: 11 MEQ/L (ref 9–15)
BUN BLDV-MCNC: 16 MG/DL (ref 8–23)
CALCIUM SERPL-MCNC: 9.9 MG/DL (ref 8.5–9.9)
CHLORIDE BLD-SCNC: 100 MEQ/L (ref 95–107)
CO2: 27 MEQ/L (ref 20–31)
CREAT SERPL-MCNC: 0.75 MG/DL (ref 0.5–0.9)
GFR AFRICAN AMERICAN: >60
GFR NON-AFRICAN AMERICAN: >60
GLUCOSE BLD-MCNC: 197 MG/DL (ref 70–99)
HCT VFR BLD CALC: 36.1 % (ref 37–47)
HEMOGLOBIN: 12.2 G/DL (ref 12–16)
LV EF: 60 %
LVEF MODALITY: NORMAL
MCH RBC QN AUTO: 30.5 PG (ref 27–31.3)
MCHC RBC AUTO-ENTMCNC: 33.9 % (ref 33–37)
MCV RBC AUTO: 89.9 FL (ref 82–100)
PDW BLD-RTO: 13.1 % (ref 11.5–14.5)
PLATELET # BLD: 369 K/UL (ref 130–400)
POTASSIUM SERPL-SCNC: 4.2 MEQ/L (ref 3.4–4.9)
RBC # BLD: 4.01 M/UL (ref 4.2–5.4)
SODIUM BLD-SCNC: 138 MEQ/L (ref 135–144)
WBC # BLD: 6.6 K/UL (ref 4.8–10.8)

## 2022-10-14 PROCEDURE — 85027 COMPLETE CBC AUTOMATED: CPT

## 2022-10-14 PROCEDURE — C1887 CATHETER, GUIDING: HCPCS

## 2022-10-14 PROCEDURE — 2709999900 HC NON-CHARGEABLE SUPPLY

## 2022-10-14 PROCEDURE — C1769 GUIDE WIRE: HCPCS

## 2022-10-14 PROCEDURE — 99152 MOD SED SAME PHYS/QHP 5/>YRS: CPT

## 2022-10-14 PROCEDURE — 99152 MOD SED SAME PHYS/QHP 5/>YRS: CPT | Performed by: INTERNAL MEDICINE

## 2022-10-14 PROCEDURE — 93458 L HRT ARTERY/VENTRICLE ANGIO: CPT | Performed by: INTERNAL MEDICINE

## 2022-10-14 PROCEDURE — 2500000003 HC RX 250 WO HCPCS

## 2022-10-14 PROCEDURE — C1894 INTRO/SHEATH, NON-LASER: HCPCS

## 2022-10-14 PROCEDURE — 92928 PRQ TCAT PLMT NTRAC ST 1 LES: CPT | Performed by: INTERNAL MEDICINE

## 2022-10-14 PROCEDURE — 6370000000 HC RX 637 (ALT 250 FOR IP)

## 2022-10-14 PROCEDURE — 6370000000 HC RX 637 (ALT 250 FOR IP): Performed by: INTERNAL MEDICINE

## 2022-10-14 PROCEDURE — 6360000002 HC RX W HCPCS

## 2022-10-14 PROCEDURE — 93458 L HRT ARTERY/VENTRICLE ANGIO: CPT

## 2022-10-14 PROCEDURE — C9600 PERC DRUG-EL COR STENT SING: HCPCS

## 2022-10-14 PROCEDURE — C1874 STENT, COATED/COV W/DEL SYS: HCPCS

## 2022-10-14 PROCEDURE — 80048 BASIC METABOLIC PNL TOTAL CA: CPT

## 2022-10-14 PROCEDURE — 6360000004 HC RX CONTRAST MEDICATION: Performed by: INTERNAL MEDICINE

## 2022-10-14 RX ORDER — NITROGLYCERIN 0.4 MG/1
0.4 TABLET SUBLINGUAL EVERY 5 MIN PRN
Status: DISCONTINUED | OUTPATIENT
Start: 2022-10-14 | End: 2022-10-18 | Stop reason: HOSPADM

## 2022-10-14 RX ORDER — CLOPIDOGREL 300 MG/1
300 TABLET, FILM COATED ORAL DAILY
Status: DISCONTINUED | OUTPATIENT
Start: 2022-10-14 | End: 2022-10-14

## 2022-10-14 RX ORDER — SODIUM CHLORIDE 0.9 % (FLUSH) 0.9 %
5-40 SYRINGE (ML) INJECTION PRN
Status: DISCONTINUED | OUTPATIENT
Start: 2022-10-14 | End: 2022-10-18 | Stop reason: HOSPADM

## 2022-10-14 RX ORDER — SODIUM CHLORIDE 0.9 % (FLUSH) 0.9 %
5-40 SYRINGE (ML) INJECTION EVERY 12 HOURS SCHEDULED
Status: DISCONTINUED | OUTPATIENT
Start: 2022-10-14 | End: 2022-10-18 | Stop reason: HOSPADM

## 2022-10-14 RX ORDER — ASPIRIN 81 MG/1
81 TABLET ORAL ONCE
Status: DISCONTINUED | OUTPATIENT
Start: 2022-10-14 | End: 2022-10-18 | Stop reason: HOSPADM

## 2022-10-14 RX ORDER — CLOPIDOGREL BISULFATE 75 MG/1
75 TABLET ORAL DAILY
Qty: 90 TABLET | Refills: 3 | Status: SHIPPED | OUTPATIENT
Start: 2022-10-14

## 2022-10-14 RX ORDER — SODIUM CHLORIDE 9 MG/ML
INJECTION, SOLUTION INTRAVENOUS PRN
Status: DISCONTINUED | OUTPATIENT
Start: 2022-10-14 | End: 2022-10-18 | Stop reason: HOSPADM

## 2022-10-14 RX ORDER — ECHINACEA 400 MG
CAPSULE ORAL
COMMUNITY

## 2022-10-14 RX ADMIN — CLOPIDOGREL BISULFATE 300 MG: 300 TABLET, FILM COATED ORAL at 14:07

## 2022-10-14 RX ADMIN — IOPAMIDOL 60 ML: 612 INJECTION, SOLUTION INTRAVENOUS at 14:08

## 2022-10-14 RX ADMIN — ASPIRIN 325 MG: 325 TABLET, DELAYED RELEASE ORAL at 14:07

## 2022-10-14 NOTE — BRIEF OP NOTE
Section of Cardiology  Adult Brief Cardiac Cath Procedure Note        Procedure(s):  LHC, b/l coronary angio, PCI of prox LAD with DESx1    Pre-operative Diagnosis:  angina    H&P Status: Completed and reviewed.      Post-operative Diagnosis:    PCI of LAD    Findings:  See full report    LV EF of 60%  LM normal   LAD prox 70%  CX mild disease  RCA mid 67%    Complications:  none    Primary Proceduralist:   Dr.Wes Shelby DO    PLAN    Eliquis and Plavis  NO ASA to avoid triple Therapy      Full procedure note to follow

## 2022-10-14 NOTE — DISCHARGE INSTRUCTIONS
No driving for 24 hours. OK to shower tomorrow and then remove the clear dressing from your right wrist.   Monitor your right wrist for bleeding. If bleeding occurs hold pressure. If unable to control bleeding call 911 or go the nearest emergency room. No heavy lifting or strenuous activity for 48 hours.

## 2022-10-14 NOTE — PROGRESS NOTES
Returned from cath lab, alert and oriented. Vasc band to R wrist, no bleeding or hematoma. VSS. Pt sitting up in bed eating lunch tray. 1500: Began to remove air from vasc band     1610: All air removed from vasc band, vasc band removed from R wrist, no bleeding or hematoma. 1630: R wrist remains stable, no bleeding or hematoma. Pt up getting dressed for discharge to home. Pt walked to bathroom independently. IV removed. 1640: Discharge instructions reviewed with pt, verbalized understanding. Transported pt to outpatient surgery exit. Her significant other arrived to drive her home.

## 2022-11-04 ENCOUNTER — TELEPHONE (OUTPATIENT)
Dept: CARDIOLOGY CLINIC | Age: 77
End: 2022-11-04

## 2022-11-04 NOTE — TELEPHONE ENCOUNTER
Patient states she started nose bleeds after her procedure on 10/14/2022 and taking Plavix-should she take both Eliquis and Plavix?  The nose bleeds are happening at least 3X per week-please call and advise

## 2022-11-10 ENCOUNTER — HOSPITAL ENCOUNTER (OUTPATIENT)
Dept: CARDIOLOGY | Age: 77
Discharge: HOME | End: 2022-11-10
Payer: MEDICARE

## 2022-11-10 PROCEDURE — 93280 PM DEVICE PROGR EVAL DUAL: CPT

## 2023-01-03 ENCOUNTER — TELEPHONE (OUTPATIENT)
Dept: CARDIOLOGY CLINIC | Age: 78
End: 2023-01-03

## 2023-01-03 NOTE — TELEPHONE ENCOUNTER
Pt calling to find out if she is supposed to be taking Plavix and Eliquis together? Takes Plavix 75 MG in the morning and   Takes Eliquis 5 MG 1 time in morning and 1 at bedtime.       [phone] # [phone]

## 2023-01-10 ENCOUNTER — HOSPITAL ENCOUNTER (OUTPATIENT)
Dept: CARDIAC REHAB | Age: 78
Setting detail: THERAPIES SERIES
Discharge: HOME | End: 2023-01-10
Payer: MEDICARE

## 2023-01-10 VITALS — OXYGEN SATURATION: 96 % | BODY MASS INDEX: 28.61 KG/M2 | HEIGHT: 66 IN | WEIGHT: 178 LBS | RESPIRATION RATE: 16 BRPM

## 2023-01-10 PROCEDURE — G0422 INTENS CARDIAC REHAB W/EXERC: HCPCS

## 2023-01-10 RX ORDER — CHLORAL HYDRATE 500 MG
CAPSULE ORAL
COMMUNITY

## 2023-01-10 RX ORDER — ZINC GLUCONATE 50 MG
50 TABLET ORAL DAILY
COMMUNITY

## 2023-01-10 ASSESSMENT — EJECTION FRACTION
EF_VALUE: 60
EF_VALUE: 60

## 2023-01-10 ASSESSMENT — EXERCISE STRESS TEST
PEAK_RPE: 12
PEAK_HR: 109
PEAK_BP: 168/74
PEAK_RPD: 2
PEAK_BP: 168/74

## 2023-01-10 ASSESSMENT — NEW YORK HEART ASSOCIATION (NYHA) CLASSIFICATION: NYHA FUNCTIONAL CLASS: NO NYHA CLASS OR UNABLE TO DETERMINE

## 2023-01-10 NOTE — CARDIO/PULMONARY
[address] [facility] [place],[address] Cardiac Rehab Intake Note    Patient arrived to OP Phase II with admitting DX: Angina & PCI referred by Dr. Marina Nguyen    Patient has PMH of: A fib, arthritis, chronic back pain, diabetes, hyperlipidemia, hypertension, OA, PM     Physical examination: lungs clear, heart sounds normal, strong pulse to BUE, no LE swelling, ortho pain In R knee from previous injury       Exercise: Patient tolerated warm up, six-minute walk test, 10 minutes of sustained CV exercise on Nu-Step seated stepper, and cool-down. Patient complaints/signs/symptoms: slight r knee orthopedic pain after exercising. Patient was offered to hold exercise until after ortho eval on Friday 1/12/2022 patient will return next Wednesday 1/18/2023. POC: Patient will attend OP Phase II program:  ICR 3x weekly for 12 weeks or until 36 sessions are completed.      DAVID Canales  Cardiac Rehab Coordinator

## 2023-01-13 NOTE — CARDIO/PULMONARY
Rate Your Plate Dietician Review     Rate your plate score & interpretation: Score: 48  There are some ways for the patient to make her eating habits healthier. Patient eats limited amounts of whole grains and fresh/frozen fruits and vegetables(one or less servings a day of fruits and vegetables). Enjoys frozen desserts and sweets, pastries and candy. Sometimes eats processed foods, such as deli mets and hot dogs,  Patient selects red meat and ground meat(20% plus fat content)  Dines out one to two times a week. Nutrition recommendations:  Increase daily intake of whole grains and fresh/frozen fruits and vegetables. Review guidelines for dining out. Reduce intake of sweets, pastries, and frozen desserts. Assess intake of foods high in saturated fats. Follow Up:   One-on-one consult with dietitian. Review changes made by patient in food  selections of meats, processed foods, and sweets. Assist patient with any questions regarding food choices made when dining out. Support patient in her efforts to eat a more healthy diet.

## 2023-01-18 ENCOUNTER — HOSPITAL ENCOUNTER (OUTPATIENT)
Dept: CARDIAC REHAB | Age: 78
Setting detail: THERAPIES SERIES
Discharge: HOME | End: 2023-01-18
Payer: MEDICARE

## 2023-01-18 PROCEDURE — G0422 INTENS CARDIAC REHAB W/EXERC: HCPCS

## 2023-01-18 PROCEDURE — G0423 INTENS CARDIAC REHAB NO EXER: HCPCS

## 2023-01-18 NOTE — CARDIO/PULMONARY
COVID Screening completed. Patient denies complaints, no changes to PMH or medications. Patient tolerates exercise well. Discussed with patient common stress symptoms, effects on health and offered patient \"Life Stress Test\", Handout. Patient attended [facility]" .   Electronically signed by Alexsander Bergman RN on 1/18/2023 at 2:53 PM

## 2023-01-18 NOTE — CARDIO/PULMONARY
[facility] HEALTHY MIND-SET WORKSHOP             Date: 1/18/2023        Session #: 1    Todays class covered: ( x)  Recognizing & Reducing:  ICR patient will learn about the body's adaptive response that is triggered by a variety of stressors. Patient will gain insight into the toll that chronic stress takes on their health, both emotionally and physically. CR patient will learn and practice a variety of stress management techniques. Patient will be able to effectively apply coping mechanisms in perceived stressful situations. ( ) Healthy Sleep for a Healthy Heart:     ( ) New Thoughts New Behaviors Workshop: ICR patient will learn and practice techniques for developing effective health and lifestyle goals. Patient will be able to effectively apply the goal setting process learned to develop at least one new personal goal.     ( ) Managing Moods & Relationships Workshop:  [facility] patient will learn how emotional and chronic stress factors can impact their hearts. They will learn healthy ways to handle stress and utilize positive coping mechanisms. In addition, ICR patient will learn ways to improve communication skills. Christine Arriola actively participated and verbalized understanding. Total time in the Healthy Mind-Set class was 50 minutes.

## 2023-01-20 ENCOUNTER — HOSPITAL ENCOUNTER (OUTPATIENT)
Dept: CARDIAC REHAB | Age: 78
Setting detail: THERAPIES SERIES
Discharge: HOME | End: 2023-01-20
Payer: MEDICARE

## 2023-01-20 PROCEDURE — G0422 INTENS CARDIAC REHAB W/EXERC: HCPCS

## 2023-01-20 PROCEDURE — G0423 INTENS CARDIAC REHAB NO EXER: HCPCS

## 2023-01-20 NOTE — CARDIO/PULMONARY
Scott STINSON.:  1945  Acct Number: [de-identified]  MRN:  [id number]                [facility] COOKING SCHOOL WORKSHOP             Date: 2023        Session #    Todays class covered:      () Adding Flavor     () Fast Breakfasts     () Salads and Dressings     (X) Soups and Sauces     () Simple Sides     () Appetizers and Snacks     () Delicious Desserts     () Plant Based Proteins     () Fast Evening Meals     () Weekend Breakfasts     () Efficiency Cooking     () One [facility]     Patients were shown how to choose, prep, and cook; substitutions and other options were given. Samples were offered. Recipes were given and questions answered. The patient above was in the [employer] for 55 minutes.       Electronically signed by Rogerio Khanna RD on 2023 at 12:25 PM

## 2023-01-20 NOTE — CARDIO/PULMONARY
COVID Screening completed. Patient denies complaints, no changes to PMH or medications. Patient tolerates exercise well.  Patient attended [employer] \"Savory Soups\".  Electronically signed by Damari Jane RN on 1/20/2023 at 12:22 PM

## 2023-01-23 ENCOUNTER — APPOINTMENT (OUTPATIENT)
Dept: CARDIAC REHAB | Age: 78
End: 2023-01-23
Payer: MEDICARE

## 2023-01-25 ENCOUNTER — APPOINTMENT (OUTPATIENT)
Dept: CARDIAC REHAB | Age: 78
End: 2023-01-25
Payer: MEDICARE

## 2023-01-25 PROCEDURE — G0423 INTENS CARDIAC REHAB NO EXER: HCPCS

## 2023-01-25 PROCEDURE — G0422 INTENS CARDIAC REHAB W/EXERC: HCPCS

## 2023-01-25 NOTE — CARDIO/PULMONARY
COVID Screening completed. Patient denies complaints, no changes to PMH or medications. Patient reports she received a gel injection into her right knee. She will get another injection next week. Patient is wearing a brace on her knee. Patient tolerates exercise well. Discussed benefits of healthy sleep, how sleep affects our health, and tips to help with sleep hygiene. Handout from [facility] provided. Patient attended [address] [place] [address] workshop \" Healthy Sleep for a Healthy Heart\". All equipment used in the care for this patient has been cleaned.   Electronically signed by Meliton Galvez RN on 1/25/2023 at 9:29 AM

## 2023-01-25 NOTE — CARDIO/PULMONARY
[facility] HEALTHY MIND-SET WORKSHOP             Date: 1/25/2023        Session #:    Today’s class covered:    ( )  Recognizing & Reducing:  ICR patient will learn about the body's adaptive response that is triggered by a variety of stressors. Patient will gain insight into the toll that chronic stress takes on their health, both emotionally and physically.CR patient will learn and practice a variety of stress management techniques. Patient will be able to effectively apply coping mechanisms in perceived stressful situations.    ( ) Healthy Sleep for a Healthy Heart: Patient educated on sleep patterns, sleep disorder,s how sleep affects CV health, and risk factors from poor sleep. Educated on sleep hygiene and behaviors that affect sleep.     ( ) New Thoughts New Behaviors Workshop: [facility] patient will learn and practice techniques for developing effective health and lifestyle goals. Patient will be able to effectively apply the goal setting process learned to develop at least one new personal goal.     ( ) Managing Moods & Relationships Workshop:  [facility] patient will learn how emotional and chronic stress factors can impact their hearts. They will learn healthy ways to handle stress and utilize positive coping mechanisms. In addition, [facility] patient will learn ways to improve communication skills.    Shannan actively participated and verbalized understanding.     Total time in the Healthy Mind-Set class was 55 minutes.

## 2023-01-27 ENCOUNTER — APPOINTMENT (OUTPATIENT)
Dept: CARDIAC REHAB | Age: 78
End: 2023-01-27
Payer: MEDICARE

## 2023-01-27 PROCEDURE — G0422 INTENS CARDIAC REHAB W/EXERC: HCPCS

## 2023-01-27 PROCEDURE — G0423 INTENS CARDIAC REHAB NO EXER: HCPCS

## 2023-01-27 NOTE — CARDIO/PULMONARY
COVID Screening completed. Patient denies complaints, no changes to PMH or medications. Patient tolerates exercise well. Patient attended [address] [place] [address] [place] and Sides\".   Electronically signed by Rocío Villanueva RN on 1/27/2023 at 10:24 AM

## 2023-01-27 NOTE — CARDIO/PULMONARY
COVID Screening completed. Patient denies complaints, no changes to PMH or medications. Patient tolerates exercise well. Patient attended [address] [place] [address] [place] and Sides\".   Electronically signed by Mike Cotter RN on 1/27/2023 at 11:38 AM

## 2023-01-27 NOTE — CARDIO/PULMONARY
Deloris Bangura   :  1945  Acct Number: [de-identified]  MRN:  [id number]                [facility] COOKING SCHOOL WORKSHOP             Date: 2023        Session # 3   Todays class covered:      () Adding Flavor     () Fast Breakfasts     () Salads and Dressings     () Soups and Sauces     () Simple Sides     (x) Appetizers and Snacks     () Delicious Desserts     () Plant Based Proteins     () Fast Evening Meals     () Weekend Breakfasts     () Efficiency Cooking     () One [facility]     Patients were shown how to choose, prep, and cook; substitutions and other options were given. Samples were offered. Recipes were given and questions answered. The patient above was in the [employer] for 55 minutes.       Electronically signed by Fabian Ross RD on 2023 at 11:50 AM

## 2023-01-30 ENCOUNTER — APPOINTMENT (OUTPATIENT)
Dept: CARDIAC REHAB | Age: 78
End: 2023-01-30
Payer: MEDICARE

## 2023-01-30 PROCEDURE — G0423 INTENS CARDIAC REHAB NO EXER: HCPCS

## 2023-01-30 PROCEDURE — G0422 INTENS CARDIAC REHAB W/EXERC: HCPCS

## 2023-01-30 NOTE — PROGRESS NOTES
Video Education Report - ICR/CR    Name:  Shannan Trejo     Date:  1/30/2023  MRN: [id number]     Session #:  1  Session Length: 35:29 min    Recommended Videos        []01 P[employer]in [employer] - Program Overview   34:22    []02 Overview of Pritikin Eating Plan             34:10    []03 Becoming a Pritikin    33:08     []04 Diseases of Our Time - Part 1   34:22    []05 Calorie Density     33:39   []06 Label Reading - Part 1    32:15   []07 Move it      32.54   []08 Healthy Minds, Bodies, Hearts   32:14   []09 Dining Out - Part 1    32:28   []10 Heart Disease Risk Reduction   32:13   []11 Metabolic Syndrome and Belly Fat  31:52   [x]12 Facts on Fat     35:29   []13 Diseases of Our Time - Part 2   33:07   []14 Biology of Weight Control   32:36   []15 Biomechanical Limitations   35:20   []16 Nutrition Action Plan    34:23    Additional Videos         []17 Hypertension & Heart Disease   32:39        []18 Cooking Breakfasts and Snacks  32:00   []19 Planning Your Eating Strategy   33:30   []20 Label Reading - Part 2    32:36  []21 Cooking Soups and Desserts   31:41  []22 How Our Thoughts Can Heal Our Hearts 33:05  []23 Targeting Your Nutrition Priorities  33:58  []24 Healthy Salads & Dressings   35:32  []25 Dining Out - Part 2    32:35  []26 Cooking Dinner and Sides   35:06  []27 Sleep Disorders     33:14  []28 Menu Workshop     32:06  []29 Decoding Lab Results    32:42     []30 Vitamins and Minerals    32:54  []31 Exercise Action Plan    32:26  []32 Body Composition    34:03  []33 Improving Performance    32:13  []34 Fueling a Healthy Body    31:32  []35 Introduction to Yoga    33:47  []36 Aging-Enhancing the Quality of Your Life 33:22  []37 Smoking Cessation    36:19    Comments:  Video completed

## 2023-01-30 NOTE — PROGRESS NOTES
COVID Screening completed. Patient complains of increased right knee swelling and pain over the weekend. Patient is wearing a knee brace. Plans to decrease speed and time on the TM. No changes to PMH or medications. Patient tolerates exercise well with adjustment to TM settings. Patient educated on cold weather precautions, effects of cold weather on the heart, cold weather and snow shoveling tips, and common heart attack warning signs, hand out offered. Patient views [employer] Video \"Facts on Fat\".  Electronically signed by Wendy Jay RN on 1/30/2023 at 11:33 AM

## 2023-02-01 ENCOUNTER — HOSPITAL ENCOUNTER (OUTPATIENT)
Dept: CARDIAC REHAB | Age: 78
Setting detail: THERAPIES SERIES
Discharge: HOME | End: 2023-02-01
Payer: MEDICARE

## 2023-02-01 PROCEDURE — G0422 INTENS CARDIAC REHAB W/EXERC: HCPCS

## 2023-02-01 PROCEDURE — G0423 INTENS CARDIAC REHAB NO EXER: HCPCS

## 2023-02-01 ASSESSMENT — EJECTION FRACTION: EF_VALUE: 60

## 2023-02-01 ASSESSMENT — EXERCISE STRESS TEST: PEAK_BP: 146/60

## 2023-02-01 NOTE — PROGRESS NOTES
COVID Screening completed. Patient denies complaints, no changes to PMH or medications. Patient tolerates exercise well. Patient attends [employer] \"Taking Charge of Stress\".  Electronically signed by Carlos Norris RN on 2/1/2023 at 11:04 AM

## 2023-02-01 NOTE — LETTER
Luis Enrique Shelby,     Please review and sign patients most recent ITP (our continuation of care order)      Thank you,   Chasidy BORRERO  [facility] Cardiac Rehab.

## 2023-02-02 NOTE — CARDIO/PULMONARY
[facility] HEALTHY MIND-SET WORKSHOP             Date: 2/2/2023        Session #:    Todays class covered:    (x )  Taking charge of stress:  ICR patient will learn about the body's adaptive response that is triggered by a variety of stressors. Patient will gain insight into the toll that chronic stress takes on their health, both emotionally and physically. CR patient will learn and practice a variety of stress management techniques. Patient will be able to effectively apply coping mechanisms in perceived stressful situations. ( ) Healthy Sleep for a Healthy Heart:     ( ) New Thoughts New Behaviors Workshop: ICR patient will learn and practice techniques for developing effective health and lifestyle goals. Patient will be able to effectively apply the goal setting process learned to develop at least one new personal goal.     ( ) Managing Moods & Relationships Workshop:  [facility] patient will learn how emotional and chronic stress factors can impact their hearts. They will learn healthy ways to handle stress and utilize positive coping mechanisms. In addition, ICR patient will learn ways to improve communication skills. Carmelita Morocho actively participated and verbalized understanding. Total time in the Healthy Mind-Set class was 31 minutes.

## 2023-02-03 ENCOUNTER — HOSPITAL ENCOUNTER (OUTPATIENT)
Dept: CARDIAC REHAB | Age: 78
Setting detail: THERAPIES SERIES
Discharge: HOME | End: 2023-02-03
Payer: MEDICARE

## 2023-02-03 PROCEDURE — G0423 INTENS CARDIAC REHAB NO EXER: HCPCS

## 2023-02-03 PROCEDURE — G0422 INTENS CARDIAC REHAB W/EXERC: HCPCS

## 2023-02-03 NOTE — CARDIO/PULMONARY
COVID Screening completed. Patient denies complaints, no changes to PMH or medications. Patient tolerates exercise well. Patient attended [employer] \"Tasty Appetizers and Kelley".   Electronically signed by Marilee Acuna RN on 2/3/2023 at 10:30 AM

## 2023-02-03 NOTE — CARDIO/PULMONARY
Andre Jiménez YOB: 1945  Acct Number: [de-identified]  MRN:  [id number]                [facility] COOKING SCHOOL WORKSHOP             Date: 2/3/2023        Session # 3   Todays class covered:      () Adding Flavor     () Fast Breakfasts     () Salads and Dressings     () Soups and Sauces     () Simple Sides     (x) Appetizers and Snacks     () Delicious Desserts     () Plant Based Proteins     () Fast Evening Meals     () Weekend Breakfasts     () Efficiency Cooking     () One [facility]     Patients were shown how to choose, prep, and cook; substitutions and other options were given. Samples were offered. Recipes were given and questions answered. The patient above was in the [employer] for 60 minutes.       Electronically signed by Estevan Hauser RD on 2/3/2023 at 11:42 AM

## 2023-02-06 ENCOUNTER — HOSPITAL ENCOUNTER (OUTPATIENT)
Dept: CARDIAC REHAB | Age: 78
Setting detail: THERAPIES SERIES
Discharge: HOME | End: 2023-02-06
Payer: MEDICARE

## 2023-02-06 PROCEDURE — G0422 INTENS CARDIAC REHAB W/EXERC: HCPCS

## 2023-02-06 PROCEDURE — G0423 INTENS CARDIAC REHAB NO EXER: HCPCS

## 2023-02-06 NOTE — PROGRESS NOTES
Video Education Report - ICR/CR    Name:  Karyle Perl     Date:  2/6/2023  MRN: [id number]     Session #:  2  Session Length: 34:03 min    Recommended Videos        []01 [employer] - Program Overview   34:22    []02 Overview of Pritikin Eating Plan             34:10    []03 Becoming a Moises Mohan   33:08     []04 Diseases of Our Time - Part 1   34:22    []05 Calorie Density     33:39   []06 Label Reading - Part 1    32:15   []07 Move it      32.54   []08 Healthy Minds, Bodies, Hearts   32:14   []09 Dining Out - Part 1    32:28   []10 Heart Disease Risk Reduction   98:91   []26 Metabolic Syndrome and Belly Fat  31:52   []12 Facts on Fat     35:29   []13 Diseases of Our Time - Part 2   33:07   []14 Biology of Weight Control   32:36   []15 Biomechanical Limitations   35:20   []16 Nutrition Action Plan    34:23    Additional Videos         []17 Hypertension & Heart Disease   32:39        []18 Cooking Breakfasts and Snacks  32:00   []19 Planning Your Eating Strategy   33:30   []20 Label Reading - Part 2    32:36  []21 Cooking Soups and Desserts   31:41  []22 How Our Thoughts Can Heal Our Hearts 33:05  []23 Targeting Your Nutrition Priorities  33:58  []24 Healthy Salads & Dressings   35:32  []25 Dining Out - Part 2    32:35  []26 Cooking Dinner and Sides   35:06  []27 Sleep Disorders     33:14  []28 Menu Workshop     32:06  []29 Decoding Lab Results    32:42     []30 Vitamins and Minerals    32:54  []31 Exercise Action Plan    32:26  [x]32 Body Composition    34:03  []33 Improving Performance    32:13  []34 Fueling a Healthy Body    31:32  []35 Introduction to Yoga    33:47  []36 Aging-Enhancing the Quality of Your Life 33:22  []37 Smoking Cessation    36:19    Comments:  Video completed

## 2023-02-06 NOTE — CARDIO/PULMONARY
COVID Screening completed. Patient denies complaints, no changes to PMH or medications. Patient tolerates exercise well. Discussed heart healthy tips for eating out, handout offered. Patient viewed [employer] Video \"Body Composition\".   Electronically signed by Bolivar Peña RN on 2/6/2023 at 9:52 AM

## 2023-02-08 ENCOUNTER — HOSPITAL ENCOUNTER (OUTPATIENT)
Dept: CARDIAC REHAB | Age: 78
Setting detail: THERAPIES SERIES
Discharge: HOME | End: 2023-02-08
Payer: MEDICARE

## 2023-02-08 PROCEDURE — G0422 INTENS CARDIAC REHAB W/EXERC: HCPCS

## 2023-02-08 PROCEDURE — G0423 INTENS CARDIAC REHAB NO EXER: HCPCS

## 2023-02-08 NOTE — CARDIO/PULMONARY
Yoon STINSON.:  1945    Acct Number: [de-identified]   MRN:  [id number]                         [facility] NUTRITION WORKSHOP             Date: 2023        Session # _______    Rakesh Polanco class covered:    ()  Fueling a Healthy Body  ()  Label Reading  (x)  Menu Selection  ()  Mindful Eating  ()  Targeting Nutrition Priorities    Readiness to change:    ( ) Pre-contemplative   ( ) Contemplative - ambivalent about change    ( x) Action - ready to set action plan and implement   ( ) Maintenance - has made change and is trying, and or practicing different alternative behaviors     Notes:      Ady was in the Workshop with the Dietitian for 60 minutes. The content was presented via Powerpoint, lecture, and patient participation based format. Motivational interviewing was utilized when needed, to promote change. Patient voiced understanding.     Electronically signed by Eze Walter RD on 2023 at 11:42 AM

## 2023-02-08 NOTE — CARDIO/PULMONARY
COVID Screening completed. Patient denies complaints, no changes to PMH or medications. Patient tolerates exercise well. Patient attended [employer] \"Menu's and Dining Out\". All equipment used in the care for this patient has been cleaned.   Electronically signed by Richelle Martinez RN on 2/8/2023 at 10:25 AM

## 2023-02-10 ENCOUNTER — HOSPITAL ENCOUNTER (OUTPATIENT)
Dept: CARDIAC REHAB | Age: 78
Setting detail: THERAPIES SERIES
Discharge: HOME | End: 2023-02-10
Payer: MEDICARE

## 2023-02-10 PROCEDURE — G0423 INTENS CARDIAC REHAB NO EXER: HCPCS

## 2023-02-10 PROCEDURE — G0422 INTENS CARDIAC REHAB W/EXERC: HCPCS

## 2023-02-10 NOTE — CARDIO/PULMONARY
Vince STINSON.:  1945  Acct Number: [de-identified]  MRN:  [id number]                [facility] COOKING S[facility]OL [facility]SHOP             Date: 2/10/2023        Session # 6   Todays class covered:      () Adding Flavor     () Fast Breakfasts     () Salads and Dressings     () Soups and Sauces     () Simple Sides     () Appetizers and Snacks     (X) Delicious Desserts     () Plant Based Proteins     () Fast Evening Meals     () Weekend Breakfasts     () Efficiency Cooking     () One Pot Meals     Patients were shown how to choose, prep, and cook; substitutions and other options were given. Samples were offered. Recipes were given and questions answered. The patient above was in the [employer] for 60 minutes.       Electronically signed by Farideh Sutton RD on 2/10/2023 at 1:21 PM

## 2023-02-10 NOTE — LETTER
Dr Suma Briceño,    Please review and sign updated Cardiac Rehab ITP from 2/1/2023. This is our continued order for the next 3 weeks. If it is not signed in a timely manner we could possible not get reimbursed.      Thank you,  Gerry Narayanan RN

## 2023-02-10 NOTE — CARDIO/PULMONARY
COVID Screening completed. Patient denies complaints, no changes to PMH or medications. Patient tolerates exercise well. Patient attended [employer] \"Delicious Desserts\".   Electronically signed by Juanpablo Junior RN on 2/10/2023 at 10:07 AM

## 2023-02-13 ENCOUNTER — APPOINTMENT (OUTPATIENT)
Dept: CARDIAC REHAB | Age: 78
End: 2023-02-13
Payer: MEDICARE

## 2023-02-15 ENCOUNTER — APPOINTMENT (OUTPATIENT)
Dept: CARDIAC REHAB | Age: 78
End: 2023-02-15
Payer: MEDICARE

## 2023-02-15 PROCEDURE — G0423 INTENS CARDIAC REHAB NO EXER: HCPCS

## 2023-02-15 PROCEDURE — G0422 INTENS CARDIAC REHAB W/EXERC: HCPCS

## 2023-02-15 NOTE — PROGRESS NOTES
[facility]  Cardiac Rehabilitation Department    General Meghana STINSON.:  1945    MRN:  [id number]    Date: 2/15/2023      Session Length:  60 min   Session # 1    EXERCISE WORKSHOP:  Heart Disease & Risk Reduction part 2                      The purpose of this lesson is to provide a high-level overview of the heart, heart disease, and how the Pritikin lifestyle positively impacts risk factors, focus on blood pressure. At the conclusion of this workshop, Daniel Rooney patients will understand the anatomy and physiology of the heart. Additionally, they will understand how Pritikins three pillars impact the risk factors, the progression, and the management of heart disease. Readiness to change:    ( ) Pre-contemplative   ( ) Contemplative - ambivalent about change    (x) Action - ready to set action plan and implement   ( ) Maintenance - has made change and is trying, and or practicing different alternative behaviors     Additional Notes:      Leatha Sandifer was in the Workshop with the RN for 60 minutes. The content was presented via Powerpoint, lecture, and patient participation based format. Motivational interviewing was utilized when needed, to promote change. Patient voiced understanding.     Electronically signed by Madie Carrizales RN on 2/15/2023 at 4:01 PM

## 2023-02-15 NOTE — CARDIO/PULMONARY
COVID Screening completed. Patient denies complaints, no changes to PMH or medications. Patient tolerates exercise well. Discussed BP, HTN, and non-modifiable versus modifiable risk factors. Handout provided. Patient attended [address] workshop \"Managing Heart Disease Part 2\". All equipment used in the care for this patient has been cleaned.   Electronically signed by Juancho Saenz RN on 2/15/2023 at 4:04 PM

## 2023-02-17 ENCOUNTER — APPOINTMENT (OUTPATIENT)
Dept: CARDIAC REHAB | Age: 78
End: 2023-02-17
Payer: MEDICARE

## 2023-02-17 PROCEDURE — G0423 INTENS CARDIAC REHAB NO EXER: HCPCS

## 2023-02-17 PROCEDURE — G0422 INTENS CARDIAC REHAB W/EXERC: HCPCS

## 2023-02-17 NOTE — PROGRESS NOTES
COVID Screening completed. Patient denies complaints, no changes to PMH or medications. Patient tolerates exercise well. Patient attends [employer] \"[employer] Plant Based Proteins\".  Electronically signed by Raegan Weir RN on 2/17/2023 at 11:19 AM

## 2023-02-17 NOTE — CARDIO/PULMONARY
Namrata STINSON.:  1945  Acct Number: [de-identified]  MRN:  [id number]                [facility] COOKING SCHOOL WORKSHOP             Date: 2023        Session # 7   Todays class covered:      () Adding Flavor     () Fast Breakfasts     () Salads and Dressings     () Soups and Sauces     () Simple Sides     () Appetizers and Snacks     () Delicious Desserts     (X) Plant Based Proteins     () Fast Evening Meals     () Weekend Breakfasts     () Efficiency Cooking     () One [facility]     Patients were shown how to choose, prep, and cook; substitutions and other options were given. Samples were offered. Recipes were given and questions answered. The patient above was in the [employer] for 60 minutes.       Electronically signed by Junior Arriola RD on 2023 at 12:12 PM

## 2023-02-20 ENCOUNTER — APPOINTMENT (OUTPATIENT)
Dept: CARDIAC REHAB | Age: 78
End: 2023-02-20
Payer: MEDICARE

## 2023-02-20 PROCEDURE — G0423 INTENS CARDIAC REHAB NO EXER: HCPCS

## 2023-02-20 PROCEDURE — G0422 INTENS CARDIAC REHAB W/EXERC: HCPCS

## 2023-02-20 ASSESSMENT — EJECTION FRACTION: EF_VALUE: 60

## 2023-02-20 ASSESSMENT — EXERCISE STRESS TEST: PEAK_BP: 144/66

## 2023-02-20 NOTE — PROGRESS NOTES
Video Education Report - ICR/CR    Name:  Cherelle Amaya     Date:  2/20/2023  MRN: [id number]     Session #:  3  Session Length: 32:28 min    Recommended Videos        []01 [employer] - Program Overview   34:22    []02 Overview of Pritikin Eating Plan             34:10    []03 Becoming a Ginna Arteaga   33:08     []04 Diseases of Our Time - Part 1   34:22    []05 Calorie Density     33:39   []06 Label Reading - Part 1    32:15   []07 Move it      32.54   []08 Healthy Minds, Bodies, Hearts   32:14   [x]09 Dining Out - Part 1    32:28   []10 Heart Disease Risk Reduction   53:22   []64 Metabolic Syndrome and Belly Fat  31:52   []12 Facts on Fat     35:29   []13 Diseases of Our Time - Part 2   33:07   []14 Biology of Weight Control   32:36   []15 Biomechanical Limitations   35:20   []16 Nutrition Action Plan    34:23    Additional Videos         []17 Hypertension & Heart Disease   32:39        []18 Cooking Breakfasts and Snacks  32:00   []19 Planning Your Eating Strategy   33:30   []20 Label Reading - Part 2    32:36  []21 Cooking Soups and Desserts   31:41  []22 How Our Thoughts Can Heal Our Hearts 33:05  []23 Targeting Your Nutrition Priorities  33:58  []24 Healthy Salads & Dressings   35:32  []25 Dining Out - Part 2    32:35  []26 Cooking Dinner and Sides   35:06  []27 Sleep Disorders     33:14  []28 Menu Workshop     32:06  []29 Decoding Lab Results    32:42     []30 Vitamins and Minerals    32:54  []31 Exercise Action Plan    32:26  []32 Body Composition    34:03  []33 Improving Performance    32:13  []34 Fueling a Healthy Body    31:32  []35 Introduction to Yoga    33:47  []36 Aging-Enhancing the Quality of Your Life 33:22  []37 Smoking Cessation    36:19    Comments:  Video completed

## 2023-02-20 NOTE — CARDIO/PULMONARY
COVID Screening completed. Patient denies complaints, no changes to PMH or medications. Patient tolerates exercise well. Patient viewed [employer] video \"Dining Out Part 1\". All equipment used in the care for this patient has been cleaned.   Electronically signed by Karol Preston RN on 2/20/2023 at 11:49 AM

## 2023-02-22 ENCOUNTER — APPOINTMENT (OUTPATIENT)
Dept: CARDIAC REHAB | Age: 78
End: 2023-02-22
Payer: MEDICARE

## 2023-02-22 PROCEDURE — G0422 INTENS CARDIAC REHAB W/EXERC: HCPCS

## 2023-02-22 PROCEDURE — G0423 INTENS CARDIAC REHAB NO EXER: HCPCS

## 2023-02-22 NOTE — PROGRESS NOTES
[address] [facility],First Floor A Madeline Arnoldo STINSON.:  1945    MRN:  [id number]    Date: 2023      Session Length:  45 min   Session # 2    EXERCISE WORKSHOP:  Balance Training & Fall Prevention                        The purpose of today's class is to teach ICR patients about the importance of maintaining balance as they age and ways to minimize their risk of falling. At the conclusion of this workshop, Asha Negron patients will understand the importance of their sensorimotor skills (vision, proprioception, and the vestibular system)1 in maintaining their ability to balance as they age. Patients will apply a variety of balancing and strength training exercises that are appropriate for their current level of function. Patients will understand the common causes for poor balance, possible solutions to these problems, and ways to modify their physical environment in order to minimize their fall risk. Readiness to change:    ( ) Pre-contemplative   ( ) Contemplative - ambivalent about change    (x) Action - ready to set action plan and implement   ( ) Maintenance - has made change and is trying, and or practicing different alternative behaviors     Additional Notes:  Participated in exercises and discussion     Cassie Webb was in the Workshop with the Exercise Physiologist for 45 minutes. The content was presented via Powerpoint, lecture, and patient participation based format. Motivational interviewing was utilized when needed, to promote change. Patient voiced understanding.     Electronically signed by Vanesa Granger on 2023 at 3:54 PM

## 2023-02-22 NOTE — CARDIO/PULMONARY
COVID Screening completed. Patient denies complaints, no changes to PMH or medications. Patient tolerates exercise well. Discussed THR, RPE, met levels and progression, patient verbalizes understanding. Patient attended Rita Salazar Workshop [employer] and Fall Prevention\".   Electronically signed by Brayan Noguera RN on 2/22/2023 at 3:37 PM

## 2023-02-24 ENCOUNTER — APPOINTMENT (OUTPATIENT)
Dept: CARDIAC REHAB | Age: 78
End: 2023-02-24
Payer: MEDICARE

## 2023-02-24 PROCEDURE — G0422 INTENS CARDIAC REHAB W/EXERC: HCPCS

## 2023-02-24 PROCEDURE — G0423 INTENS CARDIAC REHAB NO EXER: HCPCS

## 2023-02-24 NOTE — CARDIO/PULMONARY
Airam Russo YOB: 1945  Acct Number: [de-identified]  MRN:  [id number]                [facility] COOKING SCHOOL WORKSHOP             Date: 2023        Session #9    Todays class covered:      () Adding Flavor     () Fast Breakfasts     () Salads and Dressings     () Soups and Sauces     () Simple Sides     () Appetizers and Snacks     () Delicious Desserts     () Plant Based Proteins     () Fast Evening Meals     () Weekend Breakfasts     () Efficiency Cooking     () One [facility]     Patients were shown how to choose, prep, and cook; substitutions and other options were given. Samples were offered. Recipes were given and questions answered. The patient above was in the [employer] for 60 minutes.       Electronically signed by Poncho Hubbard RD on 2023 at 12:10 PM

## 2023-02-24 NOTE — CARDIO/PULMONARY
COVID Screening completed. Patient denies complaints, no changes to PMH or medications. Patient tolerates exercise well. Patient attended [employer] \"Fast Evening Meals\".   Electronically signed by Orville Camacho RN on 2/24/2023 at 11:56 AM

## 2023-02-27 ENCOUNTER — APPOINTMENT (OUTPATIENT)
Dept: CARDIAC REHAB | Age: 78
End: 2023-02-27
Payer: MEDICARE

## 2023-02-27 PROCEDURE — G0423 INTENS CARDIAC REHAB NO EXER: HCPCS

## 2023-02-27 PROCEDURE — G0422 INTENS CARDIAC REHAB W/EXERC: HCPCS

## 2023-02-27 NOTE — CARDIO/PULMONARY
Video Education Report - ICR/CR    Name:  Diane Waller     Date:  2/27/2023  MRN: [id number]     Session #:  4  Session Length: 34:10  min    Recommended Videos        []01 P[employer]in [employer] - Program Overview   34:22    [x]02 Overview of Pritikin Eating Plan             34:10    []03 Becoming a Christina Leijat   33:08     []04 Diseases of Our Time - Part 1   34:22    []05 Calorie Density     33:39   []06 Label Reading - Part 1    32:15   []07 Move it      32.54   []08 Healthy Minds, Bodies, Hearts   32:14   []09 Dining Out - Part 1    32:28   []10 Heart Disease Risk Reduction   25:48   []94 Metabolic Syndrome and Belly Fat  31:52   []12 Facts on Fat     35:29   []13 Diseases of Our Time - Part 2   33:07   []14 Biology of Weight Control   32:36   []15 Biomechanical Limitations   35:20   []16 Nutrition Action Plan    34:23    Additional Videos         []17 Hypertension & Heart Disease   32:39        []18 Cooking Breakfasts and Snacks  32:00   []19 Planning Your Eating Strategy   33:30   []20 Label Reading - Part 2    32:36  []21 Cooking Soups and Desserts   31:41  []22 How Our Thoughts Can Heal Our Hearts 33:05  []23 Targeting Your Nutrition Priorities  33:58  []24 Healthy Salads & Dressings   35:32  []25 Dining Out - Part 2    32:35  []26 Cooking Dinner and Sides   35:06  []27 Sleep Disorders     33:14  []28 Menu Workshop     32:06  []29 Decoding Lab Results    32:42     []30 Vitamins and Minerals    32:54  []31 Exercise Action Plan    32:26  []32 Body Composition    34:03  []33 Improving Performance    32:13  []34 Fueling a Healthy Body    31:32  []35 Introduction to Yoga    33:47  []36 Aging-Enhancing the Quality of Your Life 33:22  []37 Smoking Cessation    36:19    Comments:  Video completed, All questions answered

## 2023-02-27 NOTE — CARDIO/PULMONARY
COVID Screening completed. Patient denies complaints, no changes to PMH or medications. Patient tolerates exercise well.  Patient attended Pritikin ICR Education Class, Viewed Video “Overview of the Pritikin Eating Plan”.

## 2023-03-01 ENCOUNTER — HOSPITAL ENCOUNTER (OUTPATIENT)
Dept: CARDIAC REHAB | Age: 78
Setting detail: THERAPIES SERIES
Discharge: HOME | End: 2023-03-01
Payer: MEDICARE

## 2023-03-01 PROCEDURE — G0423 INTENS CARDIAC REHAB NO EXER: HCPCS

## 2023-03-01 PROCEDURE — G0422 INTENS CARDIAC REHAB W/EXERC: HCPCS

## 2023-03-01 NOTE — CARDIO/PULMONARY
COVID Screening completed. Patient denies complaints, no changes to PMH or medications. Patient tolerates exercise well. Patient educated on healthy snack options. Handout offered with list of alternative snack choices. Patient attended [address] [place] [address] workshop \"Fueling a Healthy Body\". All equipment used in the care for this patient has been cleaned.   Electronically signed by Richelle Martinez RN on 3/1/2023 at 9:09 AM

## 2023-03-01 NOTE — CARDIO/PULMONARY
Humera Del Toro YOB: 1945    Acct Number: [de-identified]   MRN:  [id number]                         [facility] NUTRITION WORKSHOP             Date: 3/1/2023        Session # __2_____    Ene Granados class covered:    Joey Bares)  Fueling a Healthy Body  ()  Label Reading  ()  Menu Selection  ()  Mindful Eating  ()  Targeting Nutrition Priorities    Readiness to change:    ( ) Pre-contemplative   ( ) Contemplative - ambivalent about change    ( ) Action - ready to set action plan and implement   ( ) Maintenance - has made change and is trying, and or practicing different alternative behaviors     Notes:      Jessenia Nguyen was in the Workshop with the Dietitian for 55 minutes. The content was presented via Powerpoint, lecture, and patient participation based format. Motivational interviewing was utilized when needed, to promote change. Patient voiced understanding.     Electronically signed by Ronnell Small RD on 3/1/2023 at 12:10 PM

## 2023-03-03 ENCOUNTER — HOSPITAL ENCOUNTER (OUTPATIENT)
Dept: CARDIAC REHAB | Age: 78
Setting detail: THERAPIES SERIES
End: 2023-03-03
Payer: MEDICARE

## 2023-03-06 ENCOUNTER — HOSPITAL ENCOUNTER (OUTPATIENT)
Dept: CARDIAC REHAB | Age: 78
Setting detail: THERAPIES SERIES
Discharge: HOME | End: 2023-03-06
Payer: MEDICARE

## 2023-03-06 PROCEDURE — G0423 INTENS CARDIAC REHAB NO EXER: HCPCS

## 2023-03-06 PROCEDURE — G0422 INTENS CARDIAC REHAB W/EXERC: HCPCS

## 2023-03-06 NOTE — CARDIO/PULMONARY
COVID Screening completed. Patient denies complaints, no changes to PMH or medications. Patient tolerates exercise well. Patient viewed [employer] video \"Calorie density\". All equipment used in the care for this patient has been cleaned.   Electronically signed by Patria Terrazas RN on 3/6/2023 at 10:36 AM

## 2023-03-06 NOTE — CARDIO/PULMONARY
Video Education Report - ICR/CR    Name:  Lasha Sands     Date:  3/6/2023  MRN: [id number]     Session #:  5  Session Length: 33:39 min    Recommended Videos        []01 P[employer]in [employer] - Program Overview   34:22    []02 Overview of Pritikin Eating Plan             34:10    []03 Becoming a Ketty Barbi   33:08     []04 Diseases of Our Time - Part 1   34:22    [x]05 Calorie Density     33:39   []06 Label Reading - Part 1    32:15   []07 Move it      32.54   []08 Healthy Minds, Bodies, Hearts   32:14   []09 Dining Out - Part 1    32:28   []10 Heart Disease Risk Reduction   35:26   []62 Metabolic Syndrome and Belly Fat  31:52   []12 Facts on Fat     35:29   []13 Diseases of Our Time - Part 2   33:07   []14 Biology of Weight Control   32:36   []15 Biomechanical Limitations   35:20   []16 Nutrition Action Plan    34:23    Additional Videos         []17 Hypertension & Heart Disease   32:39        []18 Cooking Breakfasts and Snacks  32:00   []19 Planning Your Eating Strategy   33:30   []20 Label Reading - Part 2    32:36  []21 Cooking Soups and Desserts   31:41  []22 How Our Thoughts Can Heal Our Hearts 33:05  []23 Targeting Your Nutrition Priorities  33:58  []24 Healthy Salads & Dressings   35:32  []25 Dining Out - Part 2    32:35  []26 Cooking Dinner and Sides   35:06  []27 Sleep Disorders     33:14  []28 Menu Workshop     32:06  []29 Decoding Lab Results    32:42     []30 Vitamins and Minerals    32:54  []31 Exercise Action Plan    32:26  []32 Body Composition    34:03  []33 Improving Performance    32:13  []34 Fueling a Healthy Body    31:32  []35 Introduction to Yoga    33:47  []36 Aging-Enhancing the Quality of Your Life 33:22  []37 Smoking Cessation    36:19    Comments:  Video completed, all questions answered

## 2023-03-08 ENCOUNTER — HOSPITAL ENCOUNTER (OUTPATIENT)
Dept: CARDIAC REHAB | Age: 78
Setting detail: THERAPIES SERIES
Discharge: HOME | End: 2023-03-08
Payer: MEDICARE

## 2023-03-08 PROCEDURE — G0422 INTENS CARDIAC REHAB W/EXERC: HCPCS

## 2023-03-08 PROCEDURE — G0423 INTENS CARDIAC REHAB NO EXER: HCPCS

## 2023-03-08 NOTE — CARDIO/PULMONARY
COVID Screening completed. Patient denies complaints, no changes to PMH or medications. Patient tolerates exercise well. Discussed exercise biomechanics to facilitate proper body movement and injury prevention while working out. Handout provided. Patient attended [address] [place] [address] workshop \"Exercise Biomechanics\". All equipment used in the care for this patient has been cleaned.   Electronically signed by Ann Marie Chirinos RN on 3/8/2023 at 11:23 AM sudden onset

## 2023-03-08 NOTE — PROGRESS NOTES
Josee STINSON.:  1945    MRN:  [id number]    Date: 3/8/2023      Session Length: 45 min   Session # 3    EXERCISE WORKSHOP:  Exercise Biomechanics                        Todays class addressed structural parts of the body, and how they function. We also addressed how these functions impact daily activities, movement, and exercise. Patients learned how to promote neutral spine, how to manage pain, and how to identify ways to improve their physcial movement in order to promote healthy living. Readiness to change:    ( ) Pre-contemplative   ( ) Contemplative - ambivalent about change    (x) Action - ready to set action plan and implement   ( ) Maintenance - has made change and is trying, and or practicing different alternative behaviors     Additional Notes:  Participated in discussion     Brittany Valle was in the Workshop with the Exercise Physiologist for 45 minutes. The content was presented via Powerpoint, lecture, and patient participation based format. Motivational interviewing was utilized when needed, to promote change. Patient voiced understanding.     Electronically signed by Merlene Andre on 3/8/2023 at 1:24 PM

## 2023-03-10 ENCOUNTER — HOSPITAL ENCOUNTER (OUTPATIENT)
Dept: CARDIAC REHAB | Age: 78
Setting detail: THERAPIES SERIES
Discharge: HOME | End: 2023-03-10
Payer: MEDICARE

## 2023-03-10 PROCEDURE — G0423 INTENS CARDIAC REHAB NO EXER: HCPCS

## 2023-03-10 PROCEDURE — G0422 INTENS CARDIAC REHAB W/EXERC: HCPCS

## 2023-03-10 NOTE — CARDIO/PULMONARY
Rosalva STINSON.:  1945  Acct Number: [de-identified]  MRN:  [id number]                [facility] COOKING SCHOOL WORKSHOP             Date: 3/10/2023        Session # 11   Todays class covered:      () Adding Flavor     () Fast Breakfasts     () Salads and Dressings     () Soups and Sauces     () Simple Sides     () Appetizers and Snacks     () Delicious Desserts     () Plant Based Proteins     () Fast Evening Meals     () Weekend Breakfasts     () Efficiency Cooking     () One [facility]     Patients were shown how to choose, prep, and cook; substitutions and other options were given. Samples were offered. Recipes were given and questions answered. The patient above was in the [employer] for 60 minutes.       Electronically signed by Bruna Orlando RD on 3/10/2023 at 11:34 AM

## 2023-03-10 NOTE — PROGRESS NOTES
COVID Screening completed. Patient denies complaints, no changes to PMH or medications. Patient tolerates exercise well. Patient attended [facility] \"Efficiency Cooking\".  Electronically signed by Tone Santana RN on 3/10/2023 at 11:11 AM

## 2023-03-13 ENCOUNTER — APPOINTMENT (OUTPATIENT)
Dept: CARDIAC REHAB | Age: 78
End: 2023-03-13
Payer: MEDICARE

## 2023-03-13 PROCEDURE — G0422 INTENS CARDIAC REHAB W/EXERC: HCPCS

## 2023-03-13 PROCEDURE — G0423 INTENS CARDIAC REHAB NO EXER: HCPCS

## 2023-03-13 ASSESSMENT — EJECTION FRACTION: EF_VALUE: 60

## 2023-03-13 ASSESSMENT — EXERCISE STRESS TEST: PEAK_BP: 128/70

## 2023-03-13 NOTE — CARDIO/PULMONARY
Video Education Report - ICR/CR    Name:  Susan Booth     Date:  3/13/2023  MRN: [id number]     Session #:  6  Session Length: 33:05 min    Recommended Videos        []01 [employer] - Program Overview   34:22    []02 Overview of Pritikin Eating Plan             34:10    []03 Becoming a Daya Kin   33:08     []04 Diseases of Our Time - Part 1   34:22    []05 Calorie Density     33:39   []06 Label Reading - Part 1    32:15   []07 Move it      32.54   []08 Healthy Minds, Bodies, Hearts   32:14   []09 Dining Out - Part 1    32:28   []10 Heart Disease Risk Reduction   00:79   []39 Metabolic Syndrome and Belly Fat  31:52   []12 Facts on Fat     35:29   []13 Diseases of Our Time - Part 2   33:07   []14 Biology of Weight Control   32:36   []15 Biomechanical Limitations   35:20   []16 Nutrition Action Plan    34:23    Additional Videos         []17 Hypertension & Heart Disease   32:39        []18 Cooking Breakfasts and Snacks  32:00   []19 Planning Your Eating Strategy   33:30   []20 Label Reading - Part 2    32:36  []21 Cooking Soups and Desserts   31:41  [x]22 How Our Thoughts Can Heal Our Hearts 33:05  []23 Targeting Your Nutrition Priorities  33:58  []24 Healthy Salads & Dressings   35:32  []25 Dining Out - Part 2    32:35  []26 Cooking Dinner and Sides   35:06  []27 Sleep Disorders     33:14  []28 Menu Workshop     32:06  []29 Decoding Lab Results    32:42     []30 Vitamins and Minerals    32:54  []31 Exercise Action Plan    32:26  []32 Body Composition    34:03  []33 Improving Performance    32:13  []34 Fueling a Healthy Body    31:32  []35 Introduction to Yoga    33:47  []36 Aging-Enhancing the Quality of Your Life 33:22  []37 Smoking Cessation    36:19    Comments:  Video completed, all questions answered

## 2023-03-13 NOTE — CARDIO/PULMONARY
COVID Screening completed. Patient denies complaints, no changes to PMH or medications. Patient tolerates exercise well. Discussed nutrition priorities including portion sizes/servings of vegetables, fruits, whole grains, dairy, proteins and oils. Discussed limiting sugar and sodium intake. Handout from [facility] provided. Invited patient to cardiac rehab support group this coming Wednesday. Patient viewed [employer] video \"How our thoughts can heal our hearts\". All equipment used in the care for this patient has been cleaned.   Electronically signed by Lilia Lutz RN on 3/13/2023 at 10:23 AM

## 2023-03-15 ENCOUNTER — APPOINTMENT (OUTPATIENT)
Dept: CARDIAC REHAB | Age: 78
End: 2023-03-15
Payer: MEDICARE

## 2023-03-17 ENCOUNTER — APPOINTMENT (OUTPATIENT)
Dept: CARDIAC REHAB | Age: 78
End: 2023-03-17
Payer: MEDICARE

## 2023-03-20 ENCOUNTER — APPOINTMENT (OUTPATIENT)
Dept: CARDIAC REHAB | Age: 78
End: 2023-03-20
Payer: MEDICARE

## 2023-03-20 PROCEDURE — G0423 INTENS CARDIAC REHAB NO EXER: HCPCS

## 2023-03-20 PROCEDURE — G0422 INTENS CARDIAC REHAB W/EXERC: HCPCS

## 2023-03-20 NOTE — CARDIO/PULMONARY
Video Education Report - ICR/CR    Name:  Lupe Frank     Date:  3/20/2023  MRN: [id number]     Session #:  7  Session Length: 32:54 min    Recommended Videos        []01 [employer] - Program Overview   34:22    []02 Overview of Pritikin Eating Plan             34:10    []03 Becoming a Kylie Brink   33:08     []04 Diseases of Our Time - Part 1   34:22    []05 Calorie Density     33:39   []06 Label Reading - Part 1    32:15   []07 Move it      32.54   []08 Healthy Minds, Bodies, Hearts   32:14   []09 Dining Out - Part 1    32:28   []10 Heart Disease Risk Reduction   27:80   []31 Metabolic Syndrome and Belly Fat  31:52   []12 Facts on Fat     35:29   []13 Diseases of Our Time - Part 2   33:07   []14 Biology of Weight Control   32:36   []15 Biomechanical Limitations   35:20   []16 Nutrition Action Plan    34:23    Additional Videos         []17 Hypertension & Heart Disease   32:39        []18 Cooking Breakfasts and Snacks  32:00   []19 Planning Your Eating Strategy   33:30   []20 Label Reading - Part 2    32:36  []21 Cooking Soups and Desserts   31:41  []22 How Our Thoughts Can Heal Our Hearts 33:05  []23 Targeting Your Nutrition Priorities  33:58  []24 Healthy Salads & Dressings   35:32  []25 Dining Out - Part 2    32:35  []26 Cooking Dinner and Sides   35:06  []27 Sleep Disorders     33:14  []28 Menu Workshop     32:06  []29 Decoding Lab Results    32:42     [x]30 Vitamins and Minerals    32:54  []31 Exercise Action Plan    32:26  []32 Body Composition    34:03  []33 Improving Performance    32:13  []34 Fueling a Healthy Body    31:32  []35 Introduction to Yoga    33:47  []36 Aging-Enhancing the Quality of Your Life 33:22  []37 Smoking Cessation    36:19    Comments:  Video completed, all questions asnwered

## 2023-03-20 NOTE — CARDIO/PULMONARY
COVID Screening completed. Patient denies complaints, no changes to PMH or medications. Patient tolerates exercise well. Discussed risk factors for heart disease. Educated patient on modifiable risk factors (ie. stress, cholesterol levels, diabetes, sleep, alcohol, smoking, exercise, diet, BP, and weight), versus non-modifiable factors (family history, ethnicity, gender, age and previous cardiac history). Handout provided. Patient viewed [employer] video \"Vitamins and minerals\". All equipment used in the care for this patient has been cleaned.   Electronically signed by Lloyd Greene RN on 3/20/2023 at 10:26 AM

## 2023-03-22 ENCOUNTER — APPOINTMENT (OUTPATIENT)
Dept: CARDIAC REHAB | Age: 78
End: 2023-03-22
Payer: MEDICARE

## 2023-03-22 PROCEDURE — G0423 INTENS CARDIAC REHAB NO EXER: HCPCS

## 2023-03-22 PROCEDURE — G0422 INTENS CARDIAC REHAB W/EXERC: HCPCS

## 2023-03-22 NOTE — PROGRESS NOTES
[facility]  Cardiac Rehabilitation Department    Jasbir STINSON.:  1945    MRN:  [id number]    Date: 3/22/2023      Session Length:  60 min   Session # 2    EXERCISE WORKSHOP:  Heart Disease & Risk Reduction                      The purpose of this lesson is to provide a high-level overview of the heart, heart disease, and how the Pritikin lifestyle positively impacts risk factors. At the conclusion of this workshop, Margret Jennings patients will understand the anatomy and physiology of the heart. Additionally, they will understand how Pritikins three pillars impact the risk factors, the progression, and the management of heart disease. Readiness to change:    ( ) Pre-contemplative   ( ) Contemplative - ambivalent about change    (x) Action - ready to set action plan and implement   ( ) Maintenance - has made change and is trying, and or practicing different alternative behaviors     Additional Notes:      Shelia Noel was in the Workshop with the RN for 60 minutes. The content was presented via Powerpoint, lecture, and patient participation based format. Motivational interviewing was utilized when needed, to promote change. Patient voiced understanding.     Electronically signed by Viv Harding RN on 3/22/2023 at 3:26 PM

## 2023-03-22 NOTE — CARDIO/PULMONARY
COVID Screening completed. Patient denies complaints, no changes to PMH or medications. Patient tolerates exercise well. Patient attended [address] workshop \"Managing Heart Disease. Part 1\". All equipment used in the care for this patient has been cleaned.   Electronically signed by Dandre Lopez RN on 3/22/2023 at 9:59 AM

## 2023-03-23 ENCOUNTER — OFFICE VISIT (OUTPATIENT)
Dept: CARDIOLOGY CLINIC | Age: 78
End: 2023-03-23

## 2023-03-23 VITALS
HEIGHT: 65 IN | SYSTOLIC BLOOD PRESSURE: 138 MMHG | WEIGHT: 179 LBS | OXYGEN SATURATION: 97 % | HEART RATE: 79 BPM | DIASTOLIC BLOOD PRESSURE: 70 MMHG | BODY MASS INDEX: 29.82 KG/M2

## 2023-03-23 DIAGNOSIS — I44.2 COMPLETE ATRIOVENTRICULAR BLOCK (HCC): ICD-10-CM

## 2023-03-23 DIAGNOSIS — I48.0 PAF (PAROXYSMAL ATRIAL FIBRILLATION) (HCC): Primary | ICD-10-CM

## 2023-03-23 DIAGNOSIS — I25.10 CORONARY ARTERY DISEASE INVOLVING NATIVE CORONARY ARTERY OF NATIVE HEART WITHOUT ANGINA PECTORIS: ICD-10-CM

## 2023-03-23 DIAGNOSIS — I49.5 SSS (SICK SINUS SYNDROME) (HCC): ICD-10-CM

## 2023-03-23 PROBLEM — I20.89 ANGINA AT REST: Status: RESOLVED | Noted: 2022-10-14 | Resolved: 2023-03-23

## 2023-03-23 PROBLEM — I20.8 ANGINA AT REST (HCC): Status: RESOLVED | Noted: 2022-10-14 | Resolved: 2023-03-23

## 2023-03-23 NOTE — PROGRESS NOTES
negative  Hematological and Lymphatic ROS: No history of blood clots or bleeding disorder. Respiratory ROS: no cough, shortness of breath, or wheezing  Cardiovascular ROS: no chest pain or dyspnea on exertion  Gastrointestinal ROS: negative  Genito-Urinary ROS: no dysuria, trouble voiding, or hematuria  Musculoskeletal ROS: negative  Neurological ROS: no TIA or stroke symptoms  Dermatological ROS: negative    VITALS:  Blood pressure 138/70, pulse 79, height 5' 5\" (1.651 m), weight 179 lb (81.2 kg), SpO2 97 %. Body mass index is 29.79 kg/m². Physical Examination:  General appearance - alert, well appearing, and in no distress  Mental status - alert, oriented to person, place, and time  Neck - Neck is supple, no JVD or carotid bruits. No thyromegaly or adenopathy. Chest - clear to auscultation, no wheezes, rales or rhonchi, symmetric air entry  Heart - normal rate, regular rhythm, normal S1, S2, no murmurs, rubs, clicks or gallops  Abdomen - soft, nontender, nondistended, no masses or organomegaly  Neurological - alert, oriented, normal speech, no focal findings or movement disorder noted  Extremities - peripheral pulses normal, no pedal edema, no clubbing or cyanosis  Skin - normal coloration and turgor, no rashes, no suspicious skin lesions noted      EKG: normal sinus rhythm, nonspecific ST and T waves changes    Orders Placed This Encounter   Procedures    EKG 12 lead       ASSESSMENT:     Diagnosis Orders   1. PAF (paroxysmal atrial fibrillation) ([facility])  EKG 12 lead      2. SSS (sick sinus syndrome) ([facility] [address]        3. Coronary artery disease involving native coronary artery of native heart without angina pectoris        4. Complete atrioventricular block (HCC)              PLAN:         As always, aggressive risk factor modification is strongly recommended. We should adhere to the JNC VIII guidelines for HTN management and the NCEP ATP III guidelines for LDL-C management.     Cardiac diet is always

## 2023-03-24 ENCOUNTER — APPOINTMENT (OUTPATIENT)
Dept: CARDIAC REHAB | Age: 78
End: 2023-03-24
Payer: MEDICARE

## 2023-03-24 PROCEDURE — G0422 INTENS CARDIAC REHAB W/EXERC: HCPCS

## 2023-03-24 PROCEDURE — G0423 INTENS CARDIAC REHAB NO EXER: HCPCS

## 2023-03-24 NOTE — CARDIO/PULMONARY
COVID Screening completed. Patient denies complaints, no changes to PMH or medications. Patient tolerates exercise well. Patient attended [employer] \"Adding Flavor - Sodium [employer]".   Electronically signed by Yrn Ma RN on 3/24/2023 at 11:45 AM

## 2023-03-24 NOTE — CARDIO/PULMONARY
Selma STINSON.:  1945  Acct Number: [de-identified]  MRN:  [id number]                [facility] COOKING SCHOOL WORKSHOP             Date: 3/24/2023        Session # 12   Todays class covered:      (x) Adding Flavor     () Fast Breakfasts     () Salads and Dressings     () Soups and Sauces     () Simple Sides     () Appetizers and Snacks     () Delicious Desserts     () Plant Based Proteins     () Fast Evening Meals     () Weekend Breakfasts     () Efficiency Cooking     () One [facility]     Patients were shown how to choose, prep, and cook; substitutions and other options were given. Samples were offered. Recipes were given and questions answered. The patient above was in the [employer] for 35 minutes.       Electronically signed by Tomi Greene on 3/24/2023 at 11:01 AM

## 2023-03-27 ENCOUNTER — APPOINTMENT (OUTPATIENT)
Dept: CARDIAC REHAB | Age: 78
End: 2023-03-27
Payer: MEDICARE

## 2023-03-27 PROCEDURE — G0422 INTENS CARDIAC REHAB W/EXERC: HCPCS

## 2023-03-27 PROCEDURE — G0423 INTENS CARDIAC REHAB NO EXER: HCPCS

## 2023-03-27 NOTE — CARDIO/PULMONARY
Video Education Report - ICR/CR    Name:  Rosalva Zavala     Date:  3/27/2023  MRN: [id number]     Session #:  8  Session Length: 32:39  min    Recommended Videos        []01 [employer] - Program Overview   34:22    []02 Overview of Pritikin Eating Plan             34:10    []03 Becoming a Danisha Mendoza   33:08     []04 Diseases of Our Time - Part 1   34:22    []05 Calorie Density     33:39   []06 Label Reading - Part 1    32:15   []07 Move it      32.54   []08 Healthy Minds, Bodies, Hearts   32:14   []09 Dining Out - Part 1    32:28   []10 Heart Disease Risk Reduction   53:92   []78 Metabolic Syndrome and Belly Fat  31:52   []12 Facts on Fat     35:29   []13 Diseases of Our Time - Part 2   33:07   []14 Biology of Weight Control   32:36   []15 Biomechanical Limitations   35:20   []16 Nutrition Action Plan    34:23    Additional Videos         [x]17 Hypertension & Heart Disease   32:39        []18 Cooking Breakfasts and Snacks  32:00   []19 Planning Your Eating Strategy   33:30   []20 Label Reading - Part 2    32:36  []21 Cooking Soups and Desserts   31:41  []22 How Our Thoughts Can Heal Our Hearts 33:05  []23 Targeting Your Nutrition Priorities  33:58  []24 Healthy Salads & Dressings   35:32  []25 Dining Out - Part 2    32:35  []26 Cooking Dinner and Sides   35:06  []27 Sleep Disorders     33:14  []28 Menu Workshop     32:06  []29 Decoding Lab Results    32:42     []30 Vitamins and Minerals    32:54  []31 Exercise Action Plan    32:26  []32 Body Composition    34:03  []33 Improving Performance    32:13  []34 Fueling a Healthy Body    31:32  []35 Introduction to Yoga    33:47  []36 Aging-Enhancing the Quality of Your Life 33:22  []37 Smoking Cessation    36:19    Comments:  Video completed, all questioned answered

## 2023-03-27 NOTE — CARDIO/PULMONARY
COVID Screening completed. Patient denies complaints, no changes to PMH or medications. Patient tolerates exercise well. Discussed nutrition facts: Label Reading and DASH eating plan. Handout offered. Patient viewed [employer] video \"HTN and Heart disease\". All equipment used in the care for this patient has been cleaned.   Electronically signed by Chio Trevizo RN on 3/27/2023 at 1:55 PM

## 2023-03-29 ENCOUNTER — APPOINTMENT (OUTPATIENT)
Dept: CARDIAC REHAB | Age: 78
End: 2023-03-29
Payer: MEDICARE

## 2023-03-31 ENCOUNTER — APPOINTMENT (OUTPATIENT)
Dept: CARDIAC REHAB | Age: 78
End: 2023-03-31
Payer: MEDICARE

## 2023-03-31 PROCEDURE — G0422 INTENS CARDIAC REHAB W/EXERC: HCPCS

## 2023-03-31 PROCEDURE — G0423 INTENS CARDIAC REHAB NO EXER: HCPCS

## 2023-03-31 NOTE — CARDIO/PULMONARY
Genoveva STINSON.:  1945  Acct Number: [de-identified]  MRN:  [id number]                [facility] COOKING SCHOOL WORKSHOP             Date: 3/31/2023        Session #    Todays class covered:      () Adding Flavor     (x) Fast Breakfasts     () Salads and Dressings     () Soups and Sauces     () Simple Sides     () Appetizers and Snacks     () Delicious Desserts     () Plant Based Proteins     () Fast Evening Meals     () Weekend Breakfasts     () Efficiency Cooking     () One [facility]     Patients were shown how to choose, prep, and cook; substitutions and other options were given. Samples were offered. Recipes were given and questions answered. The patient above was in the [employer] for 50 minutes.       Electronically signed by Marice Severin, RD on 3/31/2023 at 12:25 PM

## 2023-03-31 NOTE — PROGRESS NOTES
COVID Screening completed. Patient states she tripped over her dog on Tuesday. Patient fell on right knee and hip. Patient is able to ambulate without difficulty. Patient has her brace on right knee. Patient tolerates exercise well. Patient attends [address] \"Fast and Healthy Breakfasts\".  Electronically signed by Corry Cooper RN on 3/31/2023 at 10:19 AM

## 2023-04-03 ENCOUNTER — HOSPITAL ENCOUNTER (OUTPATIENT)
Dept: CARDIAC REHAB | Age: 78
Setting detail: THERAPIES SERIES
Discharge: HOME | End: 2023-04-03
Payer: MEDICARE

## 2023-04-03 PROCEDURE — G0422 INTENS CARDIAC REHAB W/EXERC: HCPCS

## 2023-04-03 PROCEDURE — G0423 INTENS CARDIAC REHAB NO EXER: HCPCS

## 2023-04-03 NOTE — CARDIO/PULMONARY
COVID Screening completed. Patient denies complaints, no changes to PMH or medications. Patient tolerates exercise well. Patient viewed [employer] \"Biomechanical Limitations\". Weekly education discussion about how much physical activity is needed according to [place], along with \"What is a MET level\", handout offered.  Electronically signed by Poonam Carlin RN on 4/3/2023 at 9:44 AM

## 2023-04-03 NOTE — PROGRESS NOTES
Video Education Report - ICR/CR    Name:  Kaveh Simpson     Date:  4/3/2023  MRN: [id number]     Session #:  9  Session Length: 35:20 min    Recommended Videos        []01 [employer] - Program Overview   34:22    []02 Overview of Pritikin Eating Plan             34:10    []03 Becoming a Carolyn De Leon   33:08     []04 Diseases of Our Time - Part 1   34:22    []05 Calorie Density     33:39   []06 Label Reading - Part 1    32:15   []07 Move it      32.54   []08 Healthy Minds, Bodies, Hearts   32:14   []09 Dining Out - Part 1    32:28   []10 Heart Disease Risk Reduction   20:06   []82 Metabolic Syndrome and Belly Fat  31:52   []12 Facts on Fat     35:29   []13 Diseases of Our Time - Part 2   33:07   []14 Biology of Weight Control   32:36   [x]15 Biomechanical Limitations   35:20   []16 Nutrition Action Plan    34:23    Additional Videos         []17 Hypertension & Heart Disease   32:39        []18 Cooking Breakfasts and Snacks  32:00   []19 Planning Your Eating Strategy   33:30   []20 Label Reading - Part 2    32:36  []21 Cooking Soups and Desserts   31:41  []22 How Our Thoughts Can Heal Our Hearts 33:05  []23 Targeting Your Nutrition Priorities  33:58  []24 Healthy Salads & Dressings   35:32  []25 Dining Out - Part 2    32:35  []26 Cooking Dinner and Sides   35:06  []27 Sleep Disorders     33:14  []28 Menu Workshop     32:06  []29 Decoding Lab Results    32:42     []30 Vitamins and Minerals    32:54  []31 Exercise Action Plan    32:26  []32 Body Composition    34:03  []33 Improving Performance    32:13  []34 Fueling a Healthy Body    31:32  []35 Introduction to Yoga    33:47  []36 Aging-Enhancing the Quality of Your Life 33:22  []37 Smoking Cessation    36:19    Comments:  Video completed

## 2023-04-04 ASSESSMENT — EJECTION FRACTION: EF_VALUE: 60

## 2023-04-04 ASSESSMENT — EXERCISE STRESS TEST: PEAK_BP: 148/72

## 2023-04-05 ENCOUNTER — HOSPITAL ENCOUNTER (OUTPATIENT)
Dept: CARDIAC REHAB | Age: 78
Setting detail: THERAPIES SERIES
Discharge: HOME | End: 2023-04-05
Payer: MEDICARE

## 2023-04-05 PROCEDURE — G0423 INTENS CARDIAC REHAB NO EXER: HCPCS

## 2023-04-05 PROCEDURE — G0422 INTENS CARDIAC REHAB W/EXERC: HCPCS

## 2023-04-05 NOTE — PROGRESS NOTES
0720-Bedside and Verbal shift change report given to EUGENIO Le,RN (oncoming nurse) by ARLENE Mak RN (offgoing nurse). Report given with Arnold LEO and MAR.    0845-Assessment done and WNL. 1350-Infant in nursery for d/c labs and tolerated well. 1910-Bedside and Verbal shift change report given to ARLENE McdowellRN (oncoming nurse) by Justin Del Real RN (offgoing nurse). Report given with Arnold LEO and MAR. COVID Screening completed. Patient denies complaints, no changes to PMH or medications. Patient tolerates exercise well. Patient attends Exercise Workshop \"Understanding the Benefits of Exercise\". Patient has 1:1 with RD.  Electronically signed by Sydnie Mcdaniel RN on 4/5/2023 at 10:21 AM

## 2023-04-05 NOTE — PROGRESS NOTES
[address] A Angel STINSON.:  1945    MRN:  [id number]    Date: 2023      Session Length:  45 min   Session # 4    EXERCISE WORKSHOP:  Understanding the Benefits of Exercise                      The purpose of today's class is to promote a comprehensive and effective weekly exercise routine in order to improve ICR patients overall level of fitness. At the conclusion of this workshop, Julia Chan patients will understand the benefits of incorporating physical activity and regular exercise into their weekly routines. Patients will understand the FITT (Frequency, Intensity, Time, and Type) principle and how this principle impacts their fitness levels. In addition, safety concerns and other considerations for exercise and cardiac rehab will be addressed by the presenter. Readiness to change:    ( ) Pre-contemplative   ( ) Contemplative - ambivalent about change    (x) Action - ready to set action plan and implement   ( ) Maintenance - has made change and is trying, and or practicing different alternative behaviors     Additional Notes:  Engaged in discussion      Asha Wiley was in the Workshop with the Exercise Specialist for 45 minutes. The content was presented via Powerpoint, lecture, and patient participation based format. Motivational interviewing was utilized when needed, to promote change. Patient voiced understanding.     Electronically signed by Zhanna Patel on 2023 at 2:30 PM

## 2023-04-05 NOTE — CARDIO/PULMONARY
Cardiac Rehab Nutrition Assessment - 1:1 Evaluation           NAME: Cherelle Amaya : 1945 AGE: 77 y.o. GENDER: female  CARDIAC REHAB ADMITTING DIAGNOSIS: PCI    PROBLEM LIST:  Active Problems:    * No active hospital problems. *  Resolved Problems:    * No resolved hospital problems. *          LABS:   No results found for: LABA1C  No results found for: EAG   Lab Results   Component Value Date/Time    CHOL 125 2020 05:43 PM    HDL 46 2020 05:43 PM     Lab Results   Component Value Date/Time     10/14/2022 11:23 AM    K 4.2 10/14/2022 11:23 AM     10/14/2022 11:23 AM    CO2 27 10/14/2022 11:23 AM    BUN 16 10/14/2022 11:23 AM    CREATININE 0.75 10/14/2022 11:23 AM    GLUCOSE 197 10/14/2022 11:23 AM    GLUCOSE 123 2020 05:25 AM    GLUCOSE 210 2020 04:00 PM    CALCIUM 9.9 10/14/2022 11:23 AM         MEDICATIONS/SUPPLEMENTS:   Prior to Admission medications    Medication Sig Start Date End Date Taking? Authorizing Provider   Magnesium Oxide (MAG- PO) Take by mouth    Historical Provider, MD   zinc gluconate 50 MG tablet Take 50 mg by mouth daily    Historical Provider, MD   [place]-3 1000 MG CAPS Take by mouth    Historical Provider, MD   Flaxed, Linseed, (FLAXSEED OIL) 1000 MG CAPS Take by mouth    Historical Provider, MD   clopidogrel (PLAVIX) 75 MG tablet Take 1 tablet by mouth daily  Patient taking differently: Take 25 mg by mouth daily 10/14/22   Rob Shelby DO   rosuvastatin (CRESTOR) 20 MG tablet  22   Historical Provider, MD   gabapentin (NEURONTIN) 300 MG capsule take 1 capsule by mouth at bedtime 22   Historical Provider, MD   traMADol (ULTRAM) 50 MG tablet Take 50 mg by mouth every 6 hours as needed for Pain.   Patient not taking: Reported on 3/23/2023    Historical Provider, MD   latanoprost (XALATAN) 0.005 % ophthalmic solution instill 1 drop into both eyes at bedtime as directed 21   Historical Provider, MD   cyanocobalamin 1000 MCG

## 2023-04-07 ENCOUNTER — HOSPITAL ENCOUNTER (OUTPATIENT)
Dept: CARDIAC REHAB | Age: 78
Setting detail: THERAPIES SERIES
Discharge: HOME | End: 2023-04-07
Payer: MEDICARE

## 2023-04-07 PROCEDURE — G0422 INTENS CARDIAC REHAB W/EXERC: HCPCS

## 2023-04-07 PROCEDURE — G0423 INTENS CARDIAC REHAB NO EXER: HCPCS

## 2023-04-07 NOTE — CARDIO/PULMONARY
COVID Screening completed. Patient denies complaints, no changes to PMH or medications. Patient tolerates exercise well. Patient noted to have high BP while on nustep. Patient had increased resistance level to 7. Patient rested and decreased resistance, BP improved. Patient attended [employer] \"Easy satisfying salads and dressings\". All equipment used in the care for this patient has been cleaned.   Electronically signed by Tricia Mansfield RN on 4/7/2023 at 10:59 AM

## 2023-04-07 NOTE — CARDIO/PULMONARY
Lenka STINSON.:  1945  Acct Number: [de-identified]  MRN:  [id number]                [facility] COOKING SCHOOL WORKSHOP             Date: 2023        Session # 3   Todays class covered:      () Adding Flavor     () Fast Breakfasts     (x) Salads and Dressings     () Soups and Sauces     () Simple Sides     () Appetizers and Snacks     () Delicious Desserts     () Plant Based Proteins     () Fast Evening Meals     () Weekend Breakfasts     () Efficiency Cooking     () One [facility]     Patients were shown how to choose, prep, and cook; substitutions and other options were given. Samples were offered. Recipes were given and questions answered. The patient above was in the [employer] for 50 minutes.       Electronically signed by Stevie White RD on 2023 at 12:00 PM

## 2023-04-07 NOTE — CARDIO/PULMONARY
COVID Screening completed. Patient denies complaints, no changes to PMH or medications. Patient tolerates exercise well.   Electronically signed by Abdelrahman Johnson RN on 4/7/2023 at 9:06 AM

## 2023-04-10 ENCOUNTER — APPOINTMENT (OUTPATIENT)
Dept: CARDIAC REHAB | Age: 78
End: 2023-04-10
Payer: MEDICARE

## 2023-04-10 NOTE — CARDIO/PULMONARY
Patient arrived to Phase II OP CR at [facility]. Patient followed COVID-19 safety protocols in place at facility including but not limited to: social distancing during exercise, sanitizing equipment before and after use,  Patient denies complaints. New signs and/or symptoms: None. Patient educated on AHA's Healthy Habits to fight stress and the Life Change Index Scale (The Stress Test). Handouts offered.    Electronically signed by Eric Mirza on 4/10/2023 at 1:21 PM

## 2023-04-10 NOTE — CARDIO/PULMONARY
Video Education Report - ICR/CR    Name:  Selma Hoskins     Date:  4/10/2023  MRN: [id number]     Session #:  10  Session Length: 31:52  min    Recommended Videos        []01 [employer] - Program Overview   34:22    []02 Overview of [employer] Eating Plan             34:10    []03 Becoming a Lonne Lemon   33:08     []04 Diseases of Our Time - Part 1   34:22    []05 Calorie Density     33:39   []06 Label Reading - Part 1    32:15   []07 Move it      32.54   []08 Healthy Minds, Bodies, Hearts   32:14   []09 Dining Out - Part 1    32:28   []10 Heart Disease Risk Reduction   18:86   [T]55 Metabolic Syndrome and Belly Fat  31:52   []12 Facts on Fat     35:29   []13 Diseases of Our Time - Part 2   33:07   []14 Biology of Weight Control   32:36   []15 Biomechanical Limitations   35:20   []16 Nutrition Action Plan    34:23    Additional Videos         []17 Hypertension & Heart Disease   32:39        []18 Cooking Breakfasts and Snacks  32:00   []19 Planning Your Eating Strategy   33:30   []20 Label Reading - Part 2    32:36  []21 Cooking Soups and Desserts   31:41  []22 How Our Thoughts Can Heal Our Hearts 33:05  []23 Targeting Your Nutrition Priorities  33:58  []24 Healthy Salads & Dressings   35:32  []25 Dining Out - Part 2    32:35  []26 Cooking Dinner and Sides   35:06  []27 Sleep Disorders     33:14  []28 Menu Workshop     32:06  []29 Decoding Lab Results    32:42     []30 Vitamins and Minerals    32:54  []31 Exercise Action Plan    32:26  []32 Body Composition    34:03  []33 Improving Performance    32:13  []34 Fueling a Healthy Body    31:32  []35 Introduction to Yoga    33:47  []36 Aging-Enhancing the Quality of Your Life 33:22  []37 Smoking Cessation    36:19    Comments:  Video completed, all questions answered

## 2023-04-12 ENCOUNTER — APPOINTMENT (OUTPATIENT)
Dept: CARDIAC REHAB | Age: 78
End: 2023-04-12
Payer: MEDICARE

## 2023-04-14 ENCOUNTER — APPOINTMENT (OUTPATIENT)
Dept: CARDIAC REHAB | Age: 78
End: 2023-04-14
Payer: MEDICARE

## 2023-04-26 ENCOUNTER — HOSPITAL ENCOUNTER (OUTPATIENT)
Dept: CARDIAC REHAB | Age: 78
Discharge: HOME | End: 2023-04-26

## 2023-04-26 ASSESSMENT — EJECTION FRACTION: EF_VALUE: 60

## 2023-04-26 ASSESSMENT — EXERCISE STRESS TEST: PEAK_BP: 148/72

## 2023-05-03 ENCOUNTER — HOSPITAL ENCOUNTER (OUTPATIENT)
Dept: CARDIAC REHAB | Age: 78
Setting detail: THERAPIES SERIES
Discharge: HOME | End: 2023-05-03
Payer: MEDICARE

## 2023-05-03 PROCEDURE — G0422 INTENS CARDIAC REHAB W/EXERC: HCPCS

## 2023-05-03 PROCEDURE — G0423 INTENS CARDIAC REHAB NO EXER: HCPCS

## 2023-05-03 NOTE — CARDIO/PULMONARY
COVID Screening completed. Patient denies complaints, no changes to PMH or medications. Patient tolerates exercise well. Weekly education Dining out, handout offered. Patient attended [employer] Workshop \"Menus and Dining Out\".   Electronically signed by Kun Cardenas RN on 5/3/2023 at 4:07 PM

## 2023-05-03 NOTE — CARDIO/PULMONARY
John STINSON.:  1945    Acct Number: [de-identified]   MRN:  [id number]                         [facility] NUTRITION WORKSHOP             Date: 5/3/2023        Session # __3_____    Titi Deluna class covered:    ()  Fueling a Healthy Body  ()  Label Reading  (x)  Menu Selection  ()  Mindful Eating  ()  Targeting Nutrition Priorities    Readiness to change:    ( ) Pre-contemplative   ( ) Contemplative - ambivalent about change    ( ) Action - ready to set action plan and implement   ( ) Maintenance - has made change and is trying, and or practicing different alternative behaviors     Notes:      Cyndee Baldwin was in the Workshop with the Dietitian for 50 minutes. The content was presented via Powerpoint, lecture, and patient participation based format. Motivational interviewing was utilized when needed, to promote change. Patient voiced understanding.     Electronically signed by Debra Davila RD on 5/3/2023 at 3:22 PM

## 2023-05-05 ENCOUNTER — HOSPITAL ENCOUNTER (OUTPATIENT)
Dept: CARDIAC REHAB | Age: 78
Setting detail: THERAPIES SERIES
Discharge: HOME | End: 2023-05-05
Payer: MEDICARE

## 2023-05-05 PROCEDURE — G0423 INTENS CARDIAC REHAB NO EXER: HCPCS

## 2023-05-05 PROCEDURE — G0422 INTENS CARDIAC REHAB W/EXERC: HCPCS

## 2023-05-05 NOTE — PROGRESS NOTES
COVID Screening completed. Patient denies complaints, no changes to PMH or medications. Patient tolerates exercise well. Patient attends [address] \"Delicious Desserts\".  Electronically signed by Reagan Daniel RN on 5/5/2023 at 11:11 AM

## 2023-05-05 NOTE — CARDIO/PULMONARY
Carlie STINSON.:  1945  Acct Number: [de-identified]  MRN:  [id number]                [facility] COOKING SCHOOL WORKSHOP             Date: 2023        Session #7    Todays class covered:      () Adding Flavor     () Fast Breakfasts     () Salads and Dressings     () Soups and Sauces     () Simple Sides     () Appetizers and Snacks     (X) Delicious Desserts     () Plant Based Proteins     () Fast Evening Meals     () Weekend Breakfasts     () Efficiency Cooking     () One [facility]     Patients were shown how to choose, prep, and cook; substitutions and other options were given. Samples were offered. Recipes were given and questions answered. The patient above was in the [employer] for 50 minutes.       Electronically signed by Francisco Lopez RD on 2023 at 12:21 PM

## 2023-05-08 ENCOUNTER — HOSPITAL ENCOUNTER (OUTPATIENT)
Dept: CARDIAC REHAB | Age: 78
Setting detail: THERAPIES SERIES
Discharge: HOME | End: 2023-05-08
Payer: MEDICARE

## 2023-05-08 PROCEDURE — G0422 INTENS CARDIAC REHAB W/EXERC: HCPCS

## 2023-05-08 PROCEDURE — G0423 INTENS CARDIAC REHAB NO EXER: HCPCS

## 2023-05-08 NOTE — CARDIO/PULMONARY
Video Education Report - ICR/CR    Name:  Orly Mehta     Date:  5/8/2023  MRN: [id number]     Session #:  11  Session Length: 34:03 min    Recommended Videos        []01 [employer] - Program Overview   34:22    []02 Overview of Pritikin Eating Plan             34:10    []03 Becoming a Flynn Gonzaelz   33:08     []04 Diseases of Our Time - Part 1   34:22    []05 Calorie Density     33:39   []06 Label Reading - Part 1    32:15   []07 Move it      32.54   []08 Healthy Minds, Bodies, Hearts   32:14   []09 Dining Out - Part 1    32:28   []10 Heart Disease Risk Reduction   66:35   []81 Metabolic Syndrome and Belly Fat  31:52   []12 Facts on Fat     35:29   []13 Diseases of Our Time - Part 2   33:07   []14 Biology of Weight Control   32:36   []15 Biomechanical Limitations   35:20   []16 Nutrition Action Plan    34:23    Additional Videos         []17 Hypertension & Heart Disease   32:39        []18 Cooking Breakfasts and Snacks  32:00   []19 Planning Your Eating Strategy   33:30   []20 Label Reading - Part 2    32:36  []21 Cooking Soups and Desserts   31:41  []22 How Our Thoughts Can Heal Our Hearts 33:05  []23 Targeting Your Nutrition Priorities  33:58  []24 Healthy Salads & Dressings   35:32  []25 Dining Out - Part 2    32:35  []26 Cooking Dinner and Sides   35:06  []27 Sleep Disorders     33:14  []28 Menu Workshop     32:06  []29 Decoding Lab Results    32:42     []30 Vitamins and Minerals    32:54  []31 Exercise Action Plan    32:26  [x]32 Body Composition    34:03  []33 Improving Performance    32:13  []34 Fueling a Healthy Body    31:32  []35 Introduction to Yoga    33:47  []36 Aging-Enhancing the Quality of Your Life 33:22  []37 Smoking Cessation    36:19    Comments:  Video completed, all questions answered

## 2023-05-08 NOTE — CARDIO/PULMONARY
COVID Screening completed. Patient denies complaints, no changes to PMH or medications. Patient tolerates exercise well. Weekly education, Discussed \"What is high blood pressure\", high BP risk factors, DASH eating plan, maintaining BP and a healthy weight. Handout provided. Patient viewed [employer] video \"Body Composition\". Discussed discharge paperwork with patient. All equipment used in the care for this patient has been cleaned.   Electronically signed by Armond Monique RN on 5/8/2023 at 1:13 PM No

## 2023-05-10 ENCOUNTER — HOSPITAL ENCOUNTER (OUTPATIENT)
Dept: CARDIAC REHAB | Age: 78
Setting detail: THERAPIES SERIES
Discharge: HOME | End: 2023-05-10
Payer: MEDICARE

## 2023-05-10 PROCEDURE — G0422 INTENS CARDIAC REHAB W/EXERC: HCPCS

## 2023-05-10 NOTE — PROGRESS NOTES
COVID Screening completed. Patient denies complaints, no changes to PMH or medications. Patient tolerates exercise well. Patient unable to attend education. Patient completes 6MWT: 10 laps/1650 ft. Patient improved by 4 laps. Patient reminded of discharge paperwork that needs returned.  Electronically signed by Corby Castro RN on 5/10/2023 at 11:53 AM

## 2023-05-12 ENCOUNTER — HOSPITAL ENCOUNTER (OUTPATIENT)
Dept: CARDIAC REHAB | Age: 78
Setting detail: THERAPIES SERIES
Discharge: HOME | End: 2023-05-12
Payer: MEDICARE

## 2023-05-12 PROCEDURE — G0422 INTENS CARDIAC REHAB W/EXERC: HCPCS

## 2023-05-12 ASSESSMENT — EXERCISE STRESS TEST
PEAK_HR: 119
PEAK_BP: 154/70
PEAK_BP: 154/70

## 2023-05-12 ASSESSMENT — NEW YORK HEART ASSOCIATION (NYHA) CLASSIFICATION: NYHA FUNCTIONAL CLASS: NO NYHA CLASS OR UNABLE TO DETERMINE

## 2023-05-12 ASSESSMENT — EJECTION FRACTION: EF_VALUE: 60

## 2023-05-12 NOTE — PROGRESS NOTES
COVID Screening completed. **LAST SESSION** Patient denies complaints, no changes to PMH or medications. Patient tolerates exercise well. Patient is planning to join [address]. Patient is educated on the s/s of angina and appropriate interventions.  Electronically signed by Bj Collazo RN on 5/12/2023 at 9:55 AM

## 2023-05-15 ENCOUNTER — APPOINTMENT (OUTPATIENT)
Dept: CARDIAC REHAB | Age: 78
End: 2023-05-15
Payer: MEDICARE

## 2023-05-16 ENCOUNTER — HOSPITAL ENCOUNTER (OUTPATIENT)
Dept: CARDIOLOGY | Age: 78
Discharge: HOME | End: 2023-05-16
Payer: MEDICARE

## 2023-05-16 PROCEDURE — 93280 PM DEVICE PROGR EVAL DUAL: CPT

## 2023-08-14 ENCOUNTER — HOSPITAL ENCOUNTER (OUTPATIENT)
Dept: CARDIOLOGY | Age: 78
Discharge: HOME | End: 2023-08-14
Payer: MEDICARE

## 2023-08-14 PROCEDURE — 93296 REM INTERROG EVL PM/IDS: CPT

## 2023-12-11 PROCEDURE — 93294 REM INTERROG EVL PM/LDLS PM: CPT | Performed by: INTERNAL MEDICINE

## 2023-12-11 PROCEDURE — 93296 REM INTERROG EVL PM/IDS: CPT | Performed by: INTERNAL MEDICINE

## 2024-01-21 NOTE — ED PROVIDER NOTES
for Pain    BIOTIN 1 MG CAPS    Take 5,000 mcg by mouth daily     CHOLECALCIFEROL (VITAMIN D) 50 MCG (2000 UT) CAPS CAPSULE    Take by mouth Indications: with Vit C    CHOLECALCIFEROL 50 MCG (2000 UT) CAPS    Take 1 capsule by mouth daily    COENZYME Q10 (COQ-10 PO)    Take 200 mg by mouth daily    DILTIAZEM (DILTIAZEM CD) 120 MG EXTENDED RELEASE CAPSULE    Take 120 mg by mouth nightly     DOCUSATE SODIUM (COLACE) 100 MG CAPSULE    Take 100 mg by mouth 2 times daily    ELIQUIS 5 MG TABS TABLET    Take 5 mg by mouth 2 times daily     GABAPENTIN (NEURONTIN) 100 MG CAPSULE    Take 1 capsule by mouth 3 times daily for 30 days. GLIPIZIDE (GLUCOTROL XL) 2.5 MG EXTENDED RELEASE TABLET    Take 2.5 mg by mouth daily    LATANOPROST OP    Apply 1 drop to eye every evening Indications: OU     LOSARTAN (COZAAR) 25 MG TABLET    Take 25 mg by mouth nightly     MAGNESIUM (MAGNESIUM-OXIDE) 250 MG TABS TABLET    Take 250 mg by mouth daily    MAGNESIUM PO    Take 250 mg by mouth daily    METFORMIN (GLUCOPHAGE) 1000 MG TABLET    Take 1,000 mg by mouth 2 times daily (with meals)    ROSUVASTATIN (CRESTOR) 10 MG TABLET    Take 20 mg by mouth nightly     TIZANIDINE (ZANAFLEX) 4 MG TABLET    Take 4 mg by mouth every 6 hours as needed       ALLERGIES     Other and Dilaudid [hydromorphone hcl]    FAMILY HISTORY       Family History   Problem Relation Age of Onset    Arthritis Mother     High Blood Pressure Father     Heart Disease Father     Heart Failure Father     No Known Problems Sister     Heart Disease Son         cardiac stents    Diabetes Son     Diabetes Daughter         neuropathy    Thyroid Disease Daughter     Diabetes Sister     Kidney Disease Sister           SOCIAL HISTORY       Social History     Socioeconomic History    Marital status:       Spouse name: None    Number of children: None    Years of education: None    Highest education level: None   Occupational History    None   Social Needs    DC instructions

## 2024-03-11 PROCEDURE — 93294 REM INTERROG EVL PM/LDLS PM: CPT | Performed by: INTERNAL MEDICINE

## 2024-03-11 PROCEDURE — 93296 REM INTERROG EVL PM/IDS: CPT | Performed by: INTERNAL MEDICINE

## 2024-04-04 ENCOUNTER — OFFICE VISIT (OUTPATIENT)
Dept: CARDIOLOGY CLINIC | Age: 79
End: 2024-04-04
Payer: MEDICARE

## 2024-04-04 VITALS
SYSTOLIC BLOOD PRESSURE: 138 MMHG | DIASTOLIC BLOOD PRESSURE: 80 MMHG | HEART RATE: 103 BPM | HEIGHT: 65 IN | BODY MASS INDEX: 29.32 KG/M2 | WEIGHT: 176 LBS

## 2024-04-04 DIAGNOSIS — I49.5 SSS (SICK SINUS SYNDROME) (HCC): ICD-10-CM

## 2024-04-04 DIAGNOSIS — E78.2 MIXED HYPERLIPIDEMIA: ICD-10-CM

## 2024-04-04 DIAGNOSIS — I10 ESSENTIAL HYPERTENSION: ICD-10-CM

## 2024-04-04 DIAGNOSIS — I25.10 CORONARY ARTERY DISEASE INVOLVING NATIVE CORONARY ARTERY OF NATIVE HEART WITHOUT ANGINA PECTORIS: ICD-10-CM

## 2024-04-04 DIAGNOSIS — I48.0 PAF (PAROXYSMAL ATRIAL FIBRILLATION) (HCC): Primary | ICD-10-CM

## 2024-04-04 PROCEDURE — 3079F DIAST BP 80-89 MM HG: CPT | Performed by: INTERNAL MEDICINE

## 2024-04-04 PROCEDURE — 1090F PRES/ABSN URINE INCON ASSESS: CPT | Performed by: INTERNAL MEDICINE

## 2024-04-04 PROCEDURE — G8399 PT W/DXA RESULTS DOCUMENT: HCPCS | Performed by: INTERNAL MEDICINE

## 2024-04-04 PROCEDURE — 3075F SYST BP GE 130 - 139MM HG: CPT | Performed by: INTERNAL MEDICINE

## 2024-04-04 PROCEDURE — G8427 DOCREV CUR MEDS BY ELIG CLIN: HCPCS | Performed by: INTERNAL MEDICINE

## 2024-04-04 PROCEDURE — 99214 OFFICE O/P EST MOD 30 MIN: CPT | Performed by: INTERNAL MEDICINE

## 2024-04-04 PROCEDURE — 1123F ACP DISCUSS/DSCN MKR DOCD: CPT | Performed by: INTERNAL MEDICINE

## 2024-04-04 PROCEDURE — G8419 CALC BMI OUT NRM PARAM NOF/U: HCPCS | Performed by: INTERNAL MEDICINE

## 2024-04-04 PROCEDURE — 1036F TOBACCO NON-USER: CPT | Performed by: INTERNAL MEDICINE

## 2024-04-04 PROCEDURE — 93000 ELECTROCARDIOGRAM COMPLETE: CPT | Performed by: INTERNAL MEDICINE

## 2024-04-04 RX ORDER — GABAPENTIN 600 MG/1
600 TABLET ORAL 2 TIMES DAILY
COMMUNITY

## 2024-04-04 NOTE — PROGRESS NOTES
dyspnea on exertion, change in exercise capacity, fatigue,  nausea, vomiting, diarrhea, constipation, motor weakness, insomnia, weight loss, syncope, dizziness, lightheadedness, palpitations, PND, orthopnea, or claudication.      23: [facility] with PCI of prox LAD. Mid RCA 50%, mild CX disease, EF of 60%. doing well. No issues. Bp is controlled   Hx of Paroxysmal afib, SSS, PPM, HTN, HLD, DM.   Last pacemaker check was in 2024 with normal pacemaker function  EKG with ST LBBB.         Patient Active Problem List   Diagnosis    Herniated lumbar disc without myelopathy    Lumbar degenerative disc disease    Spinal stenosis, lumbar region, without neurogenic claudication    Lumbosacral spondylosis without myelopathy    Foraminal stenosis of lumbar region    Lumbar spondylosis    PAF (paroxysmal atrial fibrillation) ([facility])    Diabetes mellitus type 2, uncomplicated (HCC)    Paralytic ileus (HCC)    Symptomatic bradycardia    SSS (sick sinus syndrome) ([facility])    Complete atrioventricular block (HCC)    Carpal tunnel syndrome    Essential hypertension    Hyperlipidemia    LBBB (left bundle branch block)    Primary localized osteoarthrosis, hand    Radicular syndrome of right leg    Coronary artery disease involving native coronary artery of native heart without angina pectoris       Past Surgical History:   Procedure Laterality Date    BACK SURGERY      x 3 ORs lumbar spine--last     CARPAL TUNNEL RELEASE Bilateral 2000s     SECTION   &     EYE SURGERY      [place] with IOL OU    HYSTERECTOMY (CERVIX STATUS UNKNOWN)  1987    SPINE SURGERY      TONSILLECTOMY      as child       Social History     Socioeconomic History    Marital status:    Tobacco Use    Smoking status: Never    Smokeless tobacco: Never   Vaping Use    Vaping Use: Never used   Substance and Sexual Activity    Alcohol use: Yes     Alcohol/week: 0.0 standard drinks of alcohol     Comment: social rare    Drug use: No       Family

## 2024-05-16 ENCOUNTER — HOSPITAL ENCOUNTER (OUTPATIENT)
Dept: CARDIOLOGY | Age: 79
Discharge: HOME | End: 2024-05-16
Payer: MEDICARE

## 2024-05-16 PROCEDURE — 93296 REM INTERROG EVL PM/IDS: CPT

## 2024-05-17 DIAGNOSIS — Z95.0 PACEMAKER: Primary | ICD-10-CM

## 2024-08-21 ENCOUNTER — HOSPITAL ENCOUNTER (OUTPATIENT)
Dept: CARDIOLOGY | Age: 79
Discharge: HOME | End: 2024-08-21
Payer: MEDICARE

## 2024-08-21 PROCEDURE — 93280 PM DEVICE PROGR EVAL DUAL: CPT

## 2024-12-08 ENCOUNTER — HOSPITAL ENCOUNTER (OUTPATIENT)
Dept: CARDIOLOGY | Age: 79
Discharge: HOME | End: 2024-12-08
Payer: MEDICARE

## 2024-12-08 PROCEDURE — 93296 REM INTERROG EVL PM/IDS: CPT

## 2025-03-10 ENCOUNTER — HOSPITAL ENCOUNTER (OUTPATIENT)
Dept: CARDIOLOGY | Age: 80
Discharge: HOME | End: 2025-03-10
Payer: MEDICARE

## 2025-03-10 PROCEDURE — 93280 PM DEVICE PROGR EVAL DUAL: CPT

## 2025-04-03 ENCOUNTER — OFFICE VISIT (OUTPATIENT)
Age: 80
End: 2025-04-03
Payer: MEDICARE

## 2025-04-03 VITALS
WEIGHT: 175 LBS | SYSTOLIC BLOOD PRESSURE: 120 MMHG | HEART RATE: 85 BPM | DIASTOLIC BLOOD PRESSURE: 60 MMHG | BODY MASS INDEX: 29.12 KG/M2

## 2025-04-03 DIAGNOSIS — E78.2 MIXED HYPERLIPIDEMIA: ICD-10-CM

## 2025-04-03 DIAGNOSIS — I10 ESSENTIAL HYPERTENSION: ICD-10-CM

## 2025-04-03 DIAGNOSIS — I49.5 SSS (SICK SINUS SYNDROME) (HCC): ICD-10-CM

## 2025-04-03 DIAGNOSIS — I48.0 PAF (PAROXYSMAL ATRIAL FIBRILLATION) (HCC): Primary | ICD-10-CM

## 2025-04-03 DIAGNOSIS — I25.10 CORONARY ARTERY DISEASE INVOLVING NATIVE CORONARY ARTERY OF NATIVE HEART WITHOUT ANGINA PECTORIS: ICD-10-CM

## 2025-04-03 PROCEDURE — G8419 CALC BMI OUT NRM PARAM NOF/U: HCPCS | Performed by: INTERNAL MEDICINE

## 2025-04-03 PROCEDURE — G8399 PT W/DXA RESULTS DOCUMENT: HCPCS | Performed by: INTERNAL MEDICINE

## 2025-04-03 PROCEDURE — 99213 OFFICE O/P EST LOW 20 MIN: CPT | Performed by: INTERNAL MEDICINE

## 2025-04-03 PROCEDURE — 3078F DIAST BP <80 MM HG: CPT | Performed by: INTERNAL MEDICINE

## 2025-04-03 PROCEDURE — 1159F MED LIST DOCD IN RCRD: CPT | Performed by: INTERNAL MEDICINE

## 2025-04-03 PROCEDURE — 99214 OFFICE O/P EST MOD 30 MIN: CPT | Performed by: INTERNAL MEDICINE

## 2025-04-03 PROCEDURE — 4004F PT TOBACCO SCREEN RCVD TLK: CPT | Performed by: INTERNAL MEDICINE

## 2025-04-03 PROCEDURE — 3074F SYST BP LT 130 MM HG: CPT | Performed by: INTERNAL MEDICINE

## 2025-04-03 PROCEDURE — 1090F PRES/ABSN URINE INCON ASSESS: CPT | Performed by: INTERNAL MEDICINE

## 2025-04-03 PROCEDURE — 93010 ELECTROCARDIOGRAM REPORT: CPT | Performed by: INTERNAL MEDICINE

## 2025-04-03 PROCEDURE — G8427 DOCREV CUR MEDS BY ELIG CLIN: HCPCS | Performed by: INTERNAL MEDICINE

## 2025-04-03 PROCEDURE — 93005 ELECTROCARDIOGRAM TRACING: CPT | Performed by: INTERNAL MEDICINE

## 2025-04-03 PROCEDURE — 1126F AMNT PAIN NOTED NONE PRSNT: CPT | Performed by: INTERNAL MEDICINE

## 2025-04-03 PROCEDURE — 1123F ACP DISCUSS/DSCN MKR DOCD: CPT | Performed by: INTERNAL MEDICINE

## 2025-04-03 RX ORDER — ASPIRIN 81 MG/1
81 TABLET ORAL DAILY
COMMUNITY

## 2025-04-03 RX ORDER — FERROUS SULFATE 325(65) MG
325 TABLET ORAL DAILY
COMMUNITY
Start: 2024-08-30

## 2025-04-03 RX ORDER — LOSARTAN POTASSIUM 50 MG/1
50 TABLET ORAL DAILY
COMMUNITY

## 2025-04-03 RX ORDER — GABAPENTIN 300 MG/1
300 CAPSULE ORAL DAILY
COMMUNITY

## 2025-04-03 NOTE — PROGRESS NOTES
Chief Complaint   Patient presents with    Atrial Fibrillation    Hypertension    Hyperlipidemia         [id number] is a 75 y.o. female who presents with a chief complaint of syncope. Patient is followed on a regular basis by Dr. Timmy Bales MD.  Patient with past medical history of paroxysmal atrial fibrillation, diabetes, hypertension, hyperlipidemia who presented with a syncopal episode.  She states she was at her kitchen and experienced syncope without any warning signs.  She was scheduled to have back surgery this Thursday with neuro spine care.  She is on oral anticoagulation and took her last Eliquis dose this morning.  She denied any chest pain, chest pressure heaviness.  She denies any history of myocardial infarction or congestive heart failure.  No history of cardiac catheterization.  She was noted to have severe bradycardia with heart rate in the 20s and systolic blood pressure in the 60s on arrival to the emergency department.  I was contacted by Dr. Ibanez for urgent transvenous pacemaker.  She was started on dopamine drip as well.  Her initial cardiac enzyme is negative, potassium is 4.5, magnesium of 1.6.  She denies any history of thyroid disease.      3-18-22: Patient presents for initial medical evaluation. Patient is followed on a regular basis by Timmy Armendariz MD. Hx of Paroxysmal afib, SSS, PPM, HTN, HLD, DM  Pt denies chest pain, dyspnea, dyspnea on exertion, change in exercise capacity, fatigue,  nausea, vomiting, diarrhea, constipation, motor weakness, insomnia, weight loss, syncope, dizziness, lightheadedness, palpitations, PND, orthopnea, or claudication.  S/p pacer check in 2/2022 with less than 0.1% afib.   EKG with NSR, LBBB.     3-23-23: s/p hospitalization in 10/22 and underwent C with PCI of prox LAD. Mid RCA 50%, mild CX disease, EF of 60%.   doing well. No issues. Bp is controlled   Hx of Paroxysmal afib, SSS, PPM, HTN, HLD, DM  Pt denies chest pain, dyspnea,

## 2025-06-17 ENCOUNTER — HOSPITAL ENCOUNTER (OUTPATIENT)
Dept: CARDIOLOGY | Age: 80
Discharge: HOME | End: 2025-06-17
Payer: MEDICARE

## 2025-06-17 DIAGNOSIS — I49.5 SSS (SICK SINUS SYNDROME) (HCC): ICD-10-CM

## 2025-06-17 DIAGNOSIS — Z95.0 PACEMAKER: Primary | ICD-10-CM

## 2025-06-17 PROCEDURE — 93296 REM INTERROG EVL PM/IDS: CPT
